# Patient Record
Sex: FEMALE | Race: WHITE | Employment: OTHER | URBAN - METROPOLITAN AREA
[De-identification: names, ages, dates, MRNs, and addresses within clinical notes are randomized per-mention and may not be internally consistent; named-entity substitution may affect disease eponyms.]

---

## 2019-09-16 ENCOUNTER — APPOINTMENT (OUTPATIENT)
Dept: GENERAL RADIOLOGY | Age: 59
End: 2019-09-16
Attending: EMERGENCY MEDICINE
Payer: COMMERCIAL

## 2019-09-16 ENCOUNTER — HOSPITAL ENCOUNTER (EMERGENCY)
Age: 59
Discharge: HOME | End: 2019-09-16
Attending: EMERGENCY MEDICINE
Payer: COMMERCIAL

## 2019-09-16 VITALS
OXYGEN SATURATION: 96 % | HEART RATE: 73 BPM | DIASTOLIC BLOOD PRESSURE: 77 MMHG | WEIGHT: 160 LBS | SYSTOLIC BLOOD PRESSURE: 129 MMHG | TEMPERATURE: 98.7 F | BODY MASS INDEX: 27.46 KG/M2 | RESPIRATION RATE: 12 BRPM

## 2019-09-16 DIAGNOSIS — R07.9 CHEST PAIN, UNSPECIFIED TYPE: Primary | ICD-10-CM

## 2019-09-16 LAB
ALBUMIN SERPL-MCNC: 3.8 G/DL (ref 3.4–5)
ALBUMIN/GLOB SERPL: 1.1 {RATIO} (ref 0.8–1.7)
ALP SERPL-CCNC: 78 U/L (ref 45–117)
ALT SERPL-CCNC: 21 U/L (ref 13–56)
ANION GAP SERPL CALC-SCNC: 4 MMOL/L (ref 3–18)
AST SERPL-CCNC: 20 U/L (ref 10–38)
ATRIAL RATE: 63 BPM
BASOPHILS # BLD: 0.1 K/UL (ref 0–0.1)
BASOPHILS NFR BLD: 1 % (ref 0–2)
BILIRUB SERPL-MCNC: 0.7 MG/DL (ref 0.2–1)
BUN SERPL-MCNC: 6 MG/DL (ref 7–18)
BUN/CREAT SERPL: 8 (ref 12–20)
CALCIUM SERPL-MCNC: 8.4 MG/DL (ref 8.5–10.1)
CALCULATED P AXIS, ECG09: 40 DEGREES
CALCULATED R AXIS, ECG10: -19 DEGREES
CALCULATED T AXIS, ECG11: 45 DEGREES
CHLORIDE SERPL-SCNC: 108 MMOL/L (ref 100–111)
CK MB CFR SERPL CALC: NORMAL % (ref 0–4)
CK MB CFR SERPL CALC: NORMAL % (ref 0–4)
CK MB SERPL-MCNC: <1 NG/ML (ref 5–25)
CK MB SERPL-MCNC: <1 NG/ML (ref 5–25)
CK SERPL-CCNC: 100 U/L (ref 26–192)
CK SERPL-CCNC: 115 U/L (ref 26–192)
CO2 SERPL-SCNC: 27 MMOL/L (ref 21–32)
CREAT SERPL-MCNC: 0.8 MG/DL (ref 0.6–1.3)
DIAGNOSIS, 93000: NORMAL
DIFFERENTIAL METHOD BLD: ABNORMAL
EOSINOPHIL # BLD: 0.1 K/UL (ref 0–0.4)
EOSINOPHIL NFR BLD: 2 % (ref 0–5)
ERYTHROCYTE [DISTWIDTH] IN BLOOD BY AUTOMATED COUNT: 12.2 % (ref 11.6–14.5)
GLOBULIN SER CALC-MCNC: 3.6 G/DL (ref 2–4)
GLUCOSE SERPL-MCNC: 98 MG/DL (ref 74–99)
HCT VFR BLD AUTO: 39.3 % (ref 35–45)
HGB BLD-MCNC: 13.9 G/DL (ref 12–16)
LYMPHOCYTES # BLD: 0.9 K/UL (ref 0.9–3.6)
LYMPHOCYTES NFR BLD: 18 % (ref 21–52)
MCH RBC QN AUTO: 31.4 PG (ref 24–34)
MCHC RBC AUTO-ENTMCNC: 35.4 G/DL (ref 31–37)
MCV RBC AUTO: 88.7 FL (ref 74–97)
MONOCYTES # BLD: 0.4 K/UL (ref 0.05–1.2)
MONOCYTES NFR BLD: 7 % (ref 3–10)
NEUTS SEG # BLD: 3.7 K/UL (ref 1.8–8)
NEUTS SEG NFR BLD: 72 % (ref 40–73)
P-R INTERVAL, ECG05: 164 MS
PLATELET # BLD AUTO: 268 K/UL (ref 135–420)
PMV BLD AUTO: 9.8 FL (ref 9.2–11.8)
POTASSIUM SERPL-SCNC: 3.5 MMOL/L (ref 3.5–5.5)
PROT SERPL-MCNC: 7.4 G/DL (ref 6.4–8.2)
Q-T INTERVAL, ECG07: 420 MS
QRS DURATION, ECG06: 96 MS
QTC CALCULATION (BEZET), ECG08: 429 MS
RBC # BLD AUTO: 4.43 M/UL (ref 4.2–5.3)
SODIUM SERPL-SCNC: 139 MMOL/L (ref 136–145)
TROPONIN I SERPL-MCNC: <0.02 NG/ML (ref 0–0.04)
TROPONIN I SERPL-MCNC: <0.02 NG/ML (ref 0–0.04)
VENTRICULAR RATE, ECG03: 63 BPM
WBC # BLD AUTO: 5.1 K/UL (ref 4.6–13.2)

## 2019-09-16 PROCEDURE — 71045 X-RAY EXAM CHEST 1 VIEW: CPT

## 2019-09-16 PROCEDURE — 80053 COMPREHEN METABOLIC PANEL: CPT

## 2019-09-16 PROCEDURE — 82550 ASSAY OF CK (CPK): CPT

## 2019-09-16 PROCEDURE — 99285 EMERGENCY DEPT VISIT HI MDM: CPT

## 2019-09-16 PROCEDURE — 93005 ELECTROCARDIOGRAM TRACING: CPT

## 2019-09-16 PROCEDURE — 85025 COMPLETE CBC W/AUTO DIFF WBC: CPT

## 2019-09-16 RX ORDER — NAPROXEN 500 MG/1
500 TABLET ORAL 2 TIMES DAILY WITH MEALS
Qty: 20 TAB | Refills: 0 | OUTPATIENT
Start: 2019-09-16 | End: 2019-09-20

## 2019-09-16 RX ORDER — NAPROXEN 500 MG/1
500 TABLET ORAL 2 TIMES DAILY WITH MEALS
Qty: 20 TAB | Refills: 0 | OUTPATIENT
Start: 2019-09-16 | End: 2019-09-18 | Stop reason: SDUPTHER

## 2019-09-16 NOTE — ED PROVIDER NOTES
EMERGENCY DEPARTMENT HISTORY AND PHYSICAL EXAM    11:24 AM      Date: 9/16/2019  Patient Name: Carlotta Sahu    History of Presenting Illness     Chief Complaint   Patient presents with    Chest Pain         History Provided By: Patient    Additional History (Context): Carlotta Sahu is a 61 y.o. female with No significant past medical history who presents with chest pain off and on for about a month. Patient has had midsternal chest pain since Friday shooting towards the back. She says it is about a 3 out of 10, constant, radiating towards the back. Patient states Coreg in the past has made better. Breathing deeply makes worse. Patient has some shortness of breath on exertion. She is going through extreme stressors at work. Patient denies smoking or recreational drug use. Occasional alcohol use. Anamika Ramos PCP: Genna De La Rosa MD      Current Outpatient Medications   Medication Sig Dispense Refill    naproxen (NAPROSYN) 500 mg tablet Take 1 Tab by mouth two (2) times daily (with meals) for 10 days. 20 Tab 0    naproxen (NAPROSYN) 500 mg tablet Take 1 Tab by mouth two (2) times daily (with meals) for 10 days. 20 Tab 0    ondansetron (ZOFRAN ODT) 4 mg disintegrating tablet Take 1-2 tablets every 6-8 hours as needed for nausea and vomiting. 10 Tab 2    oxyCODONE (OXYIR) 5 mg capsule Take 1-2 Caps by mouth every six (6) hours as needed. Max Daily Amount: 40 mg. 25 Cap 0    alprazolam (XANAX) 0.25 mg tablet       zolpidem (AMBIEN) 10 mg tablet       escitalopram oxalate (LEXAPRO) 10 mg tablet       ergocalciferol (ERGOCALCIFEROL) 50,000 unit capsule       CONTOUR NEXT STRIPS strip       cyanocobalamin (VITAMIN B-12) 2,500 mcg sublingual tablet Take 2,500 mcg by mouth daily.  calcium carbonate (CALTREX) 600 mg (1,500 mg) tablet Take 600 mg by mouth two (2) times a day.  multivitamins chew Take  by mouth.  Flinstones         Past History     Past Medical History:  Past Medical History: Diagnosis Date    Calculus of kidney     Congestive heart failure, unspecified ([facility] [address]     Liver disease     Fatty liver       Past Surgical History:  Past Surgical History:   Procedure Laterality Date    BREAST SURGERY PROCEDURE UNLISTED      augmentation    HX COLONOSCOPY      HX DILATION AND CURETTAGE      Multiple    HX GASTRIC BYPASS      HX HEMORRHOIDECTOMY      HX SEPTOPLASTY      HX TONSIL AND ADENOIDECTOMY         Family History:  Family History   Problem Relation Age of Onset    Heart Disease Mother     Cancer Mother     Hypertension Mother     Diabetes Mother     Heart Disease Father     Stroke Father     Cancer Brother     Hypertension Brother        Social History:  Social History     Tobacco Use    Smoking status: Never Smoker    Smokeless tobacco: Never Used   Substance Use Topics    Alcohol use: Yes    Drug use: No       Allergies: Allergies   Allergen Reactions    Tape [Adhesive] Other (comments)     Silk tape blisters         Review of Systems       Review of Systems   Constitutional: Negative. Negative for chills, diaphoresis and fever. HENT: Negative. Negative for congestion, rhinorrhea and sore throat. Eyes: Negative. Negative for pain, discharge and redness. Respiratory: Positive for chest tightness and shortness of breath. Negative for cough and wheezing. Cardiovascular: Negative. Negative for chest pain. Gastrointestinal: Negative. Negative for abdominal pain, constipation, diarrhea, nausea and vomiting. Genitourinary: Negative. Negative for dysuria, flank pain, frequency, hematuria and urgency. Musculoskeletal: Negative. Negative for back pain and neck pain. Skin: Negative. Negative for rash. Neurological: Negative. Negative for syncope, weakness, numbness and headaches. Psychiatric/Behavioral: Negative. All other systems reviewed and are negative.         Physical Exam     Visit Vitals  /77   Pulse 73   Temp 98.7 °F (37.1 °C) Resp 12   Wt 72.6 kg (160 lb)   SpO2 96%   BMI 27.46 kg/m²         Physical Exam   Constitutional: She is oriented to person, place, and time. She appears well-developed and well-nourished. Non-toxic appearance. She does not have a sickly appearance. She does not appear ill. No distress. HENT:   Head: Normocephalic and atraumatic. Mouth/Throat: Oropharynx is clear and moist. No oropharyngeal exudate. Eyes: Pupils are equal, round, and reactive to light. Conjunctivae and EOM are normal. No scleral icterus. Neck: Normal range of motion. Neck supple. No hepatojugular reflux and no JVD present. No tracheal deviation present. No thyromegaly present. Cardiovascular: Normal rate, regular rhythm, S1 normal, S2 normal, normal heart sounds, intact distal pulses and normal pulses. Exam reveals no gallop, no S3 and no S4. No murmur heard. Pulses:       Radial pulses are 2+ on the right side, and 2+ on the left side. Dorsalis pedis pulses are 2+ on the right side, and 2+ on the left side. Pulmonary/Chest: Effort normal and breath sounds normal. No respiratory distress. She has no decreased breath sounds. She has no wheezes. She has no rhonchi. She has no rales. Abdominal: Soft. Normal appearance and bowel sounds are normal. She exhibits no distension and no mass. There is no hepatosplenomegaly. There is no tenderness. There is no rigidity, no rebound, no guarding, no CVA tenderness, no tenderness at McBurney's point and negative Fox's sign. Musculoskeletal: Normal range of motion. She exhibits no edema or tenderness. Strength 5 out of 5 throughout. Lymphadenopathy:        Head (right side): No submental, no submandibular, no preauricular and no occipital adenopathy present. Head (left side): No submental, no submandibular, no preauricular and no occipital adenopathy present. She has no cervical adenopathy. Right: No supraclavicular adenopathy present.         Left: No supraclavicular adenopathy present. Neurological: She is alert and oriented to person, place, and time. She has normal strength and normal reflexes. She is not disoriented. No cranial nerve deficit or sensory deficit. Coordination and gait normal. GCS eye subscore is 4. GCS verbal subscore is 5. GCS motor subscore is 6. Grossly intact. Skin: Skin is warm, dry and intact. No rash noted. She is not diaphoretic. Psychiatric: She has a normal mood and affect. Her speech is normal and behavior is normal. Judgment and thought content normal. Cognition and memory are normal.   Nursing note and vitals reviewed. Diagnostic Study Results     Labs -  Recent Results (from the past 12 hour(s))   EKG, 12 LEAD, INITIAL    Collection Time: 09/16/19 10:33 AM   Result Value Ref Range    Ventricular Rate 63 BPM    Atrial Rate 63 BPM    P-R Interval 164 ms    QRS Duration 96 ms    Q-T Interval 420 ms    QTC Calculation (Bezet) 429 ms    Calculated P Axis 40 degrees    Calculated R Axis -19 degrees    Calculated T Axis 45 degrees    Diagnosis       Normal sinus rhythm  Low voltage QRS  Borderline ECG  When compared with ECG of 05-FEB-2015 10:03,  No significant change was found  Confirmed by Kalie Chairez ([id number]) on 9/16/2019 11:48:06 AM     CBC WITH AUTOMATED DIFF    Collection Time: 09/16/19 11:00 AM   Result Value Ref Range    WBC 5.1 4.6 - 13.2 K/uL    RBC 4.43 4.20 - 5.30 M/uL    HGB 13.9 12.0 - 16.0 g/dL    HCT 39.3 35.0 - 45.0 %    MCV 88.7 74.0 - 97.0 FL    MCH 31.4 24.0 - 34.0 PG    MCHC 35.4 31.0 - 37.0 g/dL    RDW 12.2 11.6 - 14.5 %    PLATELET 085 544 - 011 K/uL    MPV 9.8 9.2 - 11.8 FL    NEUTROPHILS 72 40 - 73 %    LYMPHOCYTES 18 (L) 21 - 52 %    MONOCYTES 7 3 - 10 %    EOSINOPHILS 2 0 - 5 %    BASOPHILS 1 0 - 2 %    ABS. NEUTROPHILS 3.7 1.8 - 8.0 K/UL    ABS. LYMPHOCYTES 0.9 0.9 - 3.6 K/UL    ABS. MONOCYTES 0.4 0.05 - 1.2 K/UL    ABS. EOSINOPHILS 0.1 0.0 - 0.4 K/UL    ABS.  BASOPHILS 0.1 0.0 - 0.1 K/UL    DF AUTOMATED     METABOLIC PANEL, COMPREHENSIVE    Collection Time: 09/16/19 11:00 AM   Result Value Ref Range    Sodium 139 136 - 145 mmol/L    Potassium 3.5 3.5 - 5.5 mmol/L    Chloride 108 100 - 111 mmol/L    CO2 27 21 - 32 mmol/L    Anion gap 4 3.0 - 18 mmol/L    Glucose 98 74 - 99 mg/dL    BUN 6 (L) 7.0 - 18 MG/DL    Creatinine 0.80 0.6 - 1.3 MG/DL    BUN/Creatinine ratio 8 (L) 12 - 20      GFR est AA >60 >60 ml/min/1.73m2    GFR est non-AA >60 >60 ml/min/1.73m2    Calcium 8.4 (L) 8.5 - 10.1 MG/DL    Bilirubin, total 0.7 0.2 - 1.0 MG/DL    ALT (SGPT) 21 13 - 56 U/L    AST (SGOT) 20 10 - 38 U/L    Alk. phosphatase 78 45 - 117 U/L    Protein, total 7.4 6.4 - 8.2 g/dL    Albumin 3.8 3.4 - 5.0 g/dL    Globulin 3.6 2.0 - 4.0 g/dL    A-G Ratio 1.1 0.8 - 1.7     CARDIAC PANEL,(CK, CKMB & TROPONIN)    Collection Time: 09/16/19 11:00 AM   Result Value Ref Range     26 - 192 U/L    CK - MB <1.0 <3.6 ng/ml    CK-MB Index  0.0 - 4.0 %     CALCULATION NOT PERFORMED WHEN RESULT IS BELOW LINEAR LIMIT    Troponin-I, QT <0.02 0.0 - 0.045 NG/ML   CARDIAC PANEL,(CK, CKMB & TROPONIN)    Collection Time: 09/16/19  1:59 PM   Result Value Ref Range     26 - 192 U/L    CK - MB <1.0 <3.6 ng/ml    CK-MB Index  0.0 - 4.0 %     CALCULATION NOT PERFORMED WHEN RESULT IS BELOW LINEAR LIMIT    Troponin-I, QT <0.02 0.0 - 0.045 NG/ML       Radiologic Studies -   XR CHEST PORT   Final Result   Impression:   1. No acute cardiopulmonary process. Medical Decision Making   Provider Notes (Medical Decision Making):  MDM  Number of Diagnoses or Management Options  Chest pain, unspecified type:       I am the first provider for this patient. I reviewed the vital signs, available nursing notes, past medical history, past surgical history, family history and social history. Vital Signs-Reviewed the patient's vital signs.     Records Reviewed: Nursing Notes (Time of Review: 11:24 AM)    ED Course: Progress Notes, Reevaluation, and Consults:    Labs essentially normal.   Chest X-Ray showed No acute process. EKG showed NSR with rate of 63 bpm. With no ST elevations or depression and non specific T wave changes. 2:24 PM 9/16/2019        Diagnosis       I have reassessed the patient. Patient is feeling well. Patient was discharged in stable condition. Patient is to return to emergency department if any new or worsening condition. Clinical Impression:   1. Chest pain, unspecified type        Disposition: D/C home     Follow-up Information     Follow up With Specialties Details Why Contact Info    Ernesto Dee MD Internal Medicine In 2 days  [address] [facility] [address]  [phone]               Attestation        Provider Attestation:     I personally performed the services described in the documentation, reviewed the documentation and it accurately and completely records my words and actions utilizing the [address] September 16, 2019 at 3:22 PM - [place], [address]. It is dictated using utilizing voice recognition software. Unfortunately this leads to occasional typographical errors. I apologize in advance if the situation occurs. If questions arise please do not hesitate to contact me or call our department.

## 2019-09-16 NOTE — DISCHARGE INSTRUCTIONS
Patient Education        Chest Pain: Care Instructions  Your Care Instructions    There are many things that can cause chest pain. Some are not serious and will get better on their own in a few days. But some kinds of chest pain need more testing and treatment. Your doctor may have recommended a follow-up visit in the next 8 to 12 hours. If you are not getting better, you may need more tests or treatment. Even though your doctor has released you, you still need to watch for any problems. The doctor carefully checked you, but sometimes problems can develop later. If you have new symptoms or if your symptoms do not get better, get medical care right away. If you have worse or different chest pain or pressure that lasts more than 5 minutes or you passed out (lost consciousness), call 911 or seek other emergency help right away. A medical visit is only one step in your treatment. Even if you feel better, you still need to do what your doctor recommends, such as going to all suggested follow-up appointments and taking medicines exactly as directed. This will help you recover and help prevent future problems. How can you care for yourself at home? · Rest until you feel better. · Take your medicine exactly as prescribed. Call your doctor if you think you are having a problem with your medicine. · Do not drive after taking a prescription pain medicine. When should you call for help? Call 911 if:    · You passed out (lost consciousness).     · You have severe difficulty breathing.     · You have symptoms of a heart attack. These may include:  ? Chest pain or pressure, or a strange feeling in your chest.  ? Sweating. ? Shortness of breath. ? Nausea or vomiting. ? Pain, pressure, or a strange feeling in your back, neck, jaw, or upper belly or in one or both shoulders or arms. ? Lightheadedness or sudden weakness. ? A fast or irregular heartbeat.   After you call 911, the  may tell you to chew 1 adult-strength or 2 to 4 low-dose aspirin. Wait for an ambulance. Do not try to drive yourself.    Call your doctor today if:    · You have any trouble breathing.     · Your chest pain gets worse.     · You are dizzy or lightheaded, or you feel like you may faint.     · You are not getting better as expected.     · You are having new or different chest pain. Where can you learn more? Go to http://temi-audrey.info/. Enter A120 in the search box to learn more about \"Chest Pain: Care Instructions. \"  Current as of: 2018  Content Version: 12.1  © 6920-3972 "[employer]. Care instructions adapted under license by [employer] (which disclaims liability or warranty for this information). If you have questions about a medical condition or this instruction, always ask your healthcare professional. [address] any warranty or liability for your use of this information. indeni Activation    Thank you for requesting access to indeni. Please follow the instructions below to securely access and download your online medical record. indeni allows you to send messages to your doctor, view your test results, renew your prescriptions, schedule appointments, and more. How Do I Sign Up? 1. In your internet browser, go to [email or web address]  2. Click on the First Time User? Click Here link in the Sign In box. You will be redirect to the New Member Sign Up page. 3. Enter your indeni Access Code exactly as it appears below. You will not need to use this code after youve completed the sign-up process. If you do not sign up before the expiration date, you must request a new code. indeni Access Code: [email or web address]  Expires: 10/31/2019 10:29 AM (This is the date your indeni access code will )    4. Enter the last four digits of your Social Security Number (xxxx) and Date of Birth (mm/dd/yyyy) as indicated and click Submit.  You will be taken to the next sign-up page. 5. Create a Alive Juicest ID. This will be your [employer] login ID and cannot be changed, so think of one that is secure and easy to remember. 6. Create a [employer] password. You can change your password at any time. 7. Enter your Password Reset Question and Answer. This can be used at a later time if you forget your password. 8. Enter your e-mail address. You will receive e-mail notification when new information is available in [address]. 9. Click Sign Up. You can now view and download portions of your medical record. 10. Click the Download Summary menu link to download a portable copy of your medical information. Additional Information    If you have questions, please visit the Frequently Asked Questions section of the [employer] website at [email or web address] Remember, [employer] is NOT to be used for urgent needs. For medical emergencies, dial 911.

## 2019-09-18 ENCOUNTER — OFFICE VISIT (OUTPATIENT)
Dept: CARDIOLOGY CLINIC | Age: 59
End: 2019-09-18

## 2019-09-18 VITALS
HEIGHT: 64 IN | WEIGHT: 159 LBS | BODY MASS INDEX: 27.14 KG/M2 | DIASTOLIC BLOOD PRESSURE: 82 MMHG | HEART RATE: 64 BPM | SYSTOLIC BLOOD PRESSURE: 126 MMHG

## 2019-09-18 DIAGNOSIS — I10 ESSENTIAL HYPERTENSION: ICD-10-CM

## 2019-09-18 DIAGNOSIS — Z82.49 FAMILY HISTORY OF PREMATURE CAD: ICD-10-CM

## 2019-09-18 DIAGNOSIS — I20.0 INTERMEDIATE CORONARY SYNDROME (HCC): Primary | ICD-10-CM

## 2019-09-18 RX ORDER — CARVEDILOL 6.25 MG/1
TABLET ORAL 2 TIMES DAILY WITH MEALS
COMMUNITY

## 2019-09-18 RX ORDER — BUPROPION HYDROCHLORIDE 300 MG/1
150 TABLET ORAL
COMMUNITY
End: 2022-02-08

## 2019-09-18 RX ORDER — ASPIRIN 81 MG/1
TABLET ORAL DAILY
COMMUNITY
End: 2019-09-20

## 2019-09-18 NOTE — PATIENT INSTRUCTIONS
High Blood Pressure: Care Instructions  Overview    It's normal for blood pressure to go up and down throughout the day. But if it stays up, you have high blood pressure. Another name for high blood pressure is hypertension. Despite what a lot of people think, high blood pressure usually doesn't cause headaches or make you feel dizzy or lightheaded. It usually has no symptoms. But it does increase your risk of stroke, heart attack, and other problems. You and your doctor will talk about your risks of these problems based on your blood pressure. Your doctor will give you a goal for your blood pressure. Your goal will be based on your health and your age. Lifestyle changes, such as eating healthy and being active, are always important to help lower blood pressure. You might also take medicine to reach your blood pressure goal.  Follow-up care is a key part of your treatment and safety. Be sure to make and go to all appointments, and call your doctor if you are having problems. It's also a good idea to know your test results and keep a list of the medicines you take. How can you care for yourself at home? Medical treatment  · If you stop taking your medicine, your blood pressure will go back up. You may take one or more types of medicine to lower your blood pressure. Be safe with medicines. Take your medicine exactly as prescribed. Call your doctor if you think you are having a problem with your medicine. · Talk to your doctor before you start taking aspirin every day. Aspirin can help certain people lower their risk of a heart attack or stroke. But taking aspirin isn't right for everyone, because it can cause serious bleeding. · See your doctor regularly. You may need to see the doctor more often at first or until your blood pressure comes down. · If you are taking blood pressure medicine, talk to your doctor before you take decongestants or anti-inflammatory medicine, such as ibuprofen.  Some of these medicines can raise blood pressure. · Learn how to check your blood pressure at home. Lifestyle changes  · Stay at a healthy weight. This is especially important if you put on weight around the waist. Losing even 10 pounds can help you lower your blood pressure. · If your doctor recommends it, get more exercise. Walking is a good choice. Bit by bit, increase the amount you walk every day. Try for at least 30 minutes on most days of the week. You also may want to swim, bike, or do other activities. · Avoid or limit alcohol. Talk to your doctor about whether you can drink any alcohol. · Try to limit how much sodium you eat to less than 2,300 milligrams (mg) a day. Your doctor may ask you to try to eat less than 1,500 mg a day. · Eat plenty of fruits (such as bananas and oranges), vegetables, legumes, whole grains, and low-fat dairy products. · Lower the amount of saturated fat in your diet. Saturated fat is found in animal products such as milk, cheese, and meat. Limiting these foods may help you lose weight and also lower your risk for heart disease. · Do not smoke. Smoking increases your risk for heart attack and stroke. If you need help quitting, talk to your doctor about stop-smoking programs and medicines. These can increase your chances of quitting for good. When should you call for help? Call 911 anytime you think you may need emergency care. This may mean having symptoms that suggest that your blood pressure is causing a serious heart or blood vessel problem. Your blood pressure may be over 180/120.   For example, call 911 if:    · You have symptoms of a heart attack. These may include:  ? Chest pain or pressure, or a strange feeling in the chest.  ? Sweating. ? Shortness of breath. ? Nausea or vomiting. ? Pain, pressure, or a strange feeling in the back, neck, jaw, or upper belly or in one or both shoulders or arms. ? Lightheadedness or sudden weakness.   ? A fast or irregular heartbeat.     · You have symptoms of a stroke. These may include:  ? Sudden numbness, tingling, weakness, or loss of movement in your face, arm, or leg, especially on only one side of your body. ? Sudden vision changes. ? Sudden trouble speaking. ? Sudden confusion or trouble understanding simple statements. ? Sudden problems with walking or balance. ? A sudden, severe headache that is different from past headaches.     · You have severe back or belly pain.    Do not wait until your blood pressure comes down on its own. Get help right away.   Call your doctor now or seek immediate care if:    · Your blood pressure is much higher than normal (such as 180/120 or higher), but you don't have symptoms.     · You think high blood pressure is causing symptoms, such as:  ? Severe headache.  ? Blurry vision.    Watch closely for changes in your health, and be sure to contact your doctor if:    · Your blood pressure measures higher than your doctor recommends at least 2 times. That means the top number is higher or the bottom number is higher, or both.     · You think you may be having side effects from your blood pressure medicine. Where can you learn more? Go to http://temi-audrey.info/. Enter P993 in the search box to learn more about \"High Blood Pressure: Care Instructions. \"  Current as of: July 22, 2018  Content Version: 12.1  © 2391-3823 [employer]. Care instructions adapted under license by [employer] (which disclaims liability or warranty for this information). If you have questions about a medical condition or this instruction, always ask your healthcare professional. [address] any warranty or liability for your use of this information. [facility] Activation    Thank you for requesting access to [facility]. Please follow the instructions below to securely access and download your online medical record.  [facility] allows you to send messages to your doctor, view your test results, renew your prescriptions, schedule appointments, and more. How Do I Sign Up? 1. In your internet browser, go to [email or web address]. 2. Click on the First Time User? Click Here link in the Sign In box. You will see the New Member Sign Up page. 3. Enter your [facility] Access Code exactly as it appears below. You will not need to use this code after youve completed the sign-up process. If you do not sign up before the expiration date, you must request a new code. [facility] Access Code: [email or web address]  Expires: 10/31/2019 10:29 AM (This is the date your [facility] access code will )    4. Enter the last four digits of your Social Security Number (xxxx) and Date of Birth (mm/dd/yyyy) as indicated and click Submit. You will be taken to the next sign-up page. 5. Create a [facility] ID. This will be your [facility] login ID and cannot be changed, so think of one that is secure and easy to remember. 6. Create a [facility] password. You can change your password at any time. 7. Enter your Password Reset Question and Answer. This can be used at a later time if you forget your password. 8. Enter your e-mail address. You will receive e-mail notification when new information is available in [address] 9. Click Sign Up. You can now view and download portions of your medical record. 10. Click the Download Summary menu link to download a portable copy of your medical information. Additional Information    If you have questions, please visit the Frequently Asked Questions section of the [employer] [facility] website at [email or web address] Remember, [facility] is NOT to be used for urgent needs. For medical emergencies, dial 911. Instructions    Patients Name:  James Whitehead    1. You are scheduled to have a Cath on 19  at [facility] Please check in at        .      2. Please go to [facility] and yesica in the outpatient parking lot that is located around to the back of the hospital and enter through the [address]. Once you enter through the [facility] check in with the  there. The  will either give you directions or assist you in getting to the cath holding area. 3. You are not to eat anything after midnight before the procedure. Please continue to drink fluids up until 12:00.  (water, juice, black coffee, soft drinks). Do not drink milk or milk products or anything with cream. You may take medications(except for diabetes) with a small sip of water before 6am on the day of the procedure. .    4. If you are diabetic, do not take your insulin/sugar pill the morning of the procedure. 5. MEDICATION INSTRUCTIONS:   Please take your morning medications with the following special instructions:    [x]          Please make sure to take your Blood pressure medication : With just enough water to swallow. [x]          Take your Aspirin and/or Plavix. With just enough water to swallow. []          Stop your Coumadin on   and do not resume it until after the procedure.     []          Take Prednisone 60 mg and Benadryl 25 mg by mouth at Bedtime on  and again on  at . This is to prevent you from having an allergic reaction to the dye. 6. We encourage families to wait in the waiting room on the first floor while the procedure is being done. The Doctor will come out and talk with you as soon as the procedure is over. 7. There is the possibility that you may spend the night in the hospital, depending on the results of the procedure. This will be determined after the procedure is done. If angioplasty or stent is planned, you will stay at least one day. 8. If you or your family have any questions, please call our office Monday -Friday, 9:00 a.m.-4:30 p.m.,  At [phone], and ask to speak to one of the nurses.

## 2019-09-18 NOTE — PROGRESS NOTES
HISTORY OF PRESENT ILLNESS  Riri Ward is a 61 y.o. female. New Patient   The history is provided by the patient. This is a recurrent problem. The problem occurs daily. The problem has been gradually worsening. Associated symptoms include chest pain. Pertinent negatives include no abdominal pain, no headaches and no shortness of breath. Chest Pain    The history is provided by the patient. This is a new problem. The problem occurs daily. The pain is present in the substernal region. The pain is at a severity of 4/10. The pain is mild. The quality of the pain is described as pressure-like. The pain radiates to the mid back. Pertinent negatives include no abdominal pain, no claudication, no cough, no diaphoresis, no dizziness, no fever, no headaches, no hemoptysis, no nausea, no orthopnea, no palpitations, no PND, no shortness of breath, no sputum production, no vomiting and no weakness. Risk factors include family history and hypertension. Review of Systems   Constitutional: Negative for chills, diaphoresis, fever and weight loss. HENT: Negative for ear pain and hearing loss. Eyes: Negative for blurred vision. Respiratory: Negative for cough, hemoptysis, sputum production, shortness of breath, wheezing and stridor. Cardiovascular: Positive for chest pain. Negative for palpitations, orthopnea, claudication, leg swelling and PND. Gastrointestinal: Negative for abdominal pain, heartburn, nausea and vomiting. Musculoskeletal: Negative for myalgias and neck pain. Skin: Negative for rash. Neurological: Negative for dizziness, tingling, tremors, focal weakness, loss of consciousness, weakness and headaches. Psychiatric/Behavioral: Negative for depression and suicidal ideas.      Family History   Problem Relation Age of Onset    Heart Disease Mother     Cancer Mother     Hypertension Mother     Diabetes Mother     Heart Disease Father     Stroke Father     Cancer Brother     Hypertension Brother        Past Medical History:   Diagnosis Date    Calculus of kidney     Congestive heart failure, unspecified     Liver disease     Fatty liver       Past Surgical History:   Procedure Laterality Date    BREAST SURGERY PROCEDURE UNLISTED      augmentation    HX COLONOSCOPY      HX DILATION AND CURETTAGE      Multiple    HX GASTRIC BYPASS      HX HEMORRHOIDECTOMY      HX SEPTOPLASTY      HX TONSIL AND ADENOIDECTOMY         Social History     Tobacco Use    Smoking status: Never Smoker    Smokeless tobacco: Never Used   Substance Use Topics    Alcohol use: Yes       Allergies   Allergen Reactions    Tape [Adhesive] Other (comments)     Silk tape blisters       Outpatient Medications Marked as Taking for the 9/18/19 encounter (Office Visit) with Alexia House MD   Medication Sig Dispense Refill    buPROPion XL (WELLBUTRIN XL) 300 mg XL tablet Take 300 mg by mouth every morning.  aspirin delayed-release 81 mg tablet Take  by mouth daily.  diphenhydramine HCl (BENADRYL PO) Take  by mouth as needed.  carvedilol (COREG) 6.25 mg tablet Take  by mouth two (2) times daily (with meals).  alprazolam (XANAX) 0.25 mg tablet       zolpidem (AMBIEN) 10 mg tablet       escitalopram oxalate (LEXAPRO) 10 mg tablet       ergocalciferol (ERGOCALCIFEROL) 50,000 unit capsule       CONTOUR NEXT STRIPS strip       cyanocobalamin (VITAMIN B-12) 2,500 mcg sublingual tablet Take 2,500 mcg by mouth daily.  calcium carbonate (CALTREX) 600 mg (1,500 mg) tablet Take 600 mg by mouth two (2) times a day.  multivitamins chew Take  by mouth. [facility]          Visit Vitals  /82   Pulse 64   Ht 5' 4\" (1.626 m)   Wt 72.1 kg (159 lb)   BMI 27.29 kg/m²     Physical Exam   Constitutional: She is oriented to person, place, and time. She appears well-developed and well-nourished. No distress. HENT:   Head: Atraumatic. Mouth/Throat: No oropharyngeal exudate.    Eyes: Conjunctivae are normal. Right eye exhibits no discharge. Left eye exhibits no discharge. No scleral icterus. Neck: Neck supple. No JVD present. No tracheal deviation present. No thyromegaly present. Cardiovascular: Normal rate and regular rhythm. Exam reveals no gallop. No murmur heard. Pulmonary/Chest: Effort normal and breath sounds normal. No stridor. She has no wheezes. She has no rales. Abdominal: Soft. There is no tenderness. There is no rebound. Musculoskeletal: Normal range of motion. She exhibits no edema or tenderness. Neurological: She is alert and oriented to person, place, and time. She exhibits normal muscle tone. Skin: Skin is warm. She is not diaphoretic. Psychiatric: She has a normal mood and affect. Her behavior is normal.     ekg sinus rhythm with no acute st-t changes  ASSESSMENT and PLAN    ICD-10-CM ICD-9-CM    1. Intermediate coronary syndrome (HCC) I20.0 411.1 CASE REQUEST CATH LAB    [facility] 3   2. Essential hypertension I10 401.9    3. Family history of premature CAD Z82.49 V17.3      No orders of the defined types were placed in this encounter. Follow-up and Dispositions    · Return in about 3 weeks (around 10/9/2019). current treatment plan is effective, no change in therapy. She is a 60-year-old female with long-standing history of hypertension and family history of premature CAD seen for recurrent episodes of chest pain. Reports chest pain is retrosternal/epigastric radiating to back lasting for several minutes. Reports several episodes in the last 3 to 5 days. EKG reviewed no acute ischemic changes. Echo at her PCP office revealed normal sinus rhythm with moderate left ventricle hypertrophy and grade 1 diastolic dysfunction. [facility] was [facility] and serial cardiac enzymes were negative. Due to recurrent chest pain and multiple cardiac risk factors, patient prefers to proceed with invasive work-up to rule out CAD.   Advised to continue aspirin at this time.

## 2019-09-20 ENCOUNTER — HOSPITAL ENCOUNTER (OUTPATIENT)
Age: 59
Setting detail: OUTPATIENT SURGERY
Discharge: HOME | End: 2019-09-20
Attending: INTERNAL MEDICINE | Admitting: INTERNAL MEDICINE
Payer: COMMERCIAL

## 2019-09-20 VITALS
SYSTOLIC BLOOD PRESSURE: 86 MMHG | WEIGHT: 159 LBS | TEMPERATURE: 98.1 F | HEART RATE: 68 BPM | DIASTOLIC BLOOD PRESSURE: 58 MMHG | BODY MASS INDEX: 27.14 KG/M2 | HEIGHT: 64 IN | RESPIRATION RATE: 17 BRPM | OXYGEN SATURATION: 96 %

## 2019-09-20 DIAGNOSIS — I20.0 INTERMEDIATE CORONARY SYNDROME (HCC): ICD-10-CM

## 2019-09-20 LAB — END DIASTOLIC PRESSURE: 13

## 2019-09-20 PROCEDURE — 74011250636 HC RX REV CODE- 250/636: Performed by: INTERNAL MEDICINE

## 2019-09-20 PROCEDURE — 99152 MOD SED SAME PHYS/QHP 5/>YRS: CPT | Performed by: INTERNAL MEDICINE

## 2019-09-20 PROCEDURE — 74011636320 HC RX REV CODE- 636/320: Performed by: INTERNAL MEDICINE

## 2019-09-20 PROCEDURE — 74011250636 HC RX REV CODE- 250/636

## 2019-09-20 PROCEDURE — 77030013744: Performed by: INTERNAL MEDICINE

## 2019-09-20 PROCEDURE — 77030029997 HC DEV COM RDL R BND TELE -B: Performed by: INTERNAL MEDICINE

## 2019-09-20 PROCEDURE — 93458 L HRT ARTERY/VENTRICLE ANGIO: CPT | Performed by: INTERNAL MEDICINE

## 2019-09-20 PROCEDURE — C1894 INTRO/SHEATH, NON-LASER: HCPCS | Performed by: INTERNAL MEDICINE

## 2019-09-20 PROCEDURE — 74011000250 HC RX REV CODE- 250: Performed by: INTERNAL MEDICINE

## 2019-09-20 PROCEDURE — 77030013797 HC KT TRNSDUC PRSSR EDWD -A: Performed by: INTERNAL MEDICINE

## 2019-09-20 PROCEDURE — 77030015766: Performed by: INTERNAL MEDICINE

## 2019-09-20 RX ORDER — HEPARIN SODIUM 1000 [USP'U]/ML
INJECTION, SOLUTION INTRAVENOUS; SUBCUTANEOUS AS NEEDED
Status: DISCONTINUED | OUTPATIENT
Start: 2019-09-20 | End: 2019-09-20 | Stop reason: HOSPADM

## 2019-09-20 RX ORDER — LIDOCAINE HYDROCHLORIDE 10 MG/ML
INJECTION, SOLUTION EPIDURAL; INFILTRATION; INTRACAUDAL; PERINEURAL AS NEEDED
Status: DISCONTINUED | OUTPATIENT
Start: 2019-09-20 | End: 2019-09-20 | Stop reason: HOSPADM

## 2019-09-20 RX ORDER — SODIUM CHLORIDE 0.9 % (FLUSH) 0.9 %
5-40 SYRINGE (ML) INJECTION EVERY 8 HOURS
Status: CANCELLED | OUTPATIENT
Start: 2019-09-20

## 2019-09-20 RX ORDER — FENTANYL CITRATE 50 UG/ML
INJECTION, SOLUTION INTRAMUSCULAR; INTRAVENOUS AS NEEDED
Status: DISCONTINUED | OUTPATIENT
Start: 2019-09-20 | End: 2019-09-20 | Stop reason: HOSPADM

## 2019-09-20 RX ORDER — NITROGLYCERIN 40 MG/100ML
INJECTION INTRAVENOUS
Status: COMPLETED | OUTPATIENT
Start: 2019-09-20 | End: 2019-09-20

## 2019-09-20 RX ORDER — VERAPAMIL HYDROCHLORIDE 2.5 MG/ML
INJECTION, SOLUTION INTRAVENOUS AS NEEDED
Status: DISCONTINUED | OUTPATIENT
Start: 2019-09-20 | End: 2019-09-20 | Stop reason: HOSPADM

## 2019-09-20 RX ORDER — SODIUM CHLORIDE 0.9 % (FLUSH) 0.9 %
5-40 SYRINGE (ML) INJECTION AS NEEDED
Status: CANCELLED | OUTPATIENT
Start: 2019-09-20

## 2019-09-20 RX ORDER — MIDAZOLAM HYDROCHLORIDE 1 MG/ML
INJECTION, SOLUTION INTRAMUSCULAR; INTRAVENOUS AS NEEDED
Status: DISCONTINUED | OUTPATIENT
Start: 2019-09-20 | End: 2019-09-20 | Stop reason: HOSPADM

## 2019-09-20 RX ADMIN — SODIUM CHLORIDE 250 ML: 9 INJECTION, SOLUTION INTRAVENOUS at 14:45

## 2019-09-20 RX ADMIN — SODIUM CHLORIDE 500 ML: 900 INJECTION, SOLUTION INTRAVENOUS at 14:09

## 2019-09-20 NOTE — Clinical Note
TRANSFER - IN REPORT:  
 
Verbal report received from: [address] [place] [address]. Report consisted of patient's Situation, Background, Assessment and  
Recommendations(SBAR). Opportunity for questions and clarification was provided. Assessment completed upon patient's arrival to unit and care assumed. Patient transported with a Cardiac Cath Tech / Patient Care Tech.

## 2019-09-20 NOTE — DISCHARGE INSTRUCTIONS
HEART CATHETERIZATION/ANGIOGRAPHY DISCHARGE INSTRUCTIONS    1. Check puncture site frequently for swelling or bleeding. If there is any bleeding, lie down and apply pressure over the area with a clean towel or washcloth. Notify your doctor for any redness, swelling, drainage, or oozing from the puncture site. Notify your doctor for any fever or chills. 2. If the extremity becomes cold, numb, or painful call  at [phone].  3. Activity should be limited for the next 48 hours. Climb stairs as little as possible and avoid any stooping, bending, or strenuous activity for 48 hours. No heavy lifting (anything over 10 pounds) for 3 days. 4. You may resume your usual diet. Drink more fluids than usual.  5. Have a responsible person drive you home and stay with you for at least 24 hours after your heart catheterization/angiography. 6. You may remove bandage from your Right and Arm in 24 hours. You may shower in 24 hours. No tub baths, hot tubs, or swimming for 1 week. Do not place any lotions, creams, powders, or ointments over puncture site for 1 week. You may place a clean band-aid over the puncture site each day for 5 days. Change daily. Coronary Angiogram: What to Expect at [address] [place] [address]     A coronary angiogram is a test to examine the large blood vessel of your heart (coronary artery). The doctor inserted a thin, flexible tube (catheter) into a blood vessel in your groin. In some cases, the catheter is placed in a blood vessel in the arm. Your groin or arm may have a bruise and feel sore for a day or two after a coronary angiogram. You can do light activities around the house but nothing strenuous for several days. This care sheet gives you a general idea about how long it will take for you to recover. But each person recovers at a different pace. Follow the steps below to feel better as quickly as possible. How can you care for yourself at home?   Activity  · Do not do strenuous exercise and do not lift, pull, or push anything heavy until your doctor says it is okay. This may be for a day or two. You can walk around the house and do light activity, such as cooking. · You may shower 24 to 48 hours after the procedure, if your doctor okays it. Pat the incision dry. Do not take a bath for 1 week, or until your doctor tells you it is okay. · If the catheter was placed in your groin, try not to walk up stairs for the first couple of days. · If the catheter was placed in your arm near your wrist, do not bend your wrist deeply for the first couple of days. Be careful using your hand to get into and out of a chair or bed. · If your doctor recommends it, get more exercise. Walking is a good choice. Bit by bit, increase the amount you walk every day. Try for at least 30 minutes on most days of the week. Diet  · Drink plenty of fluids to help your body flush out the dye. If you have kidney, heart, or liver disease and have to limit fluids, talk with your doctor before you increase the amount of fluids you drink. · Keep eating a heart-healthy diet that has lots of fruits, vegetables, and whole grains. If you have not been eating this way, talk to your doctor. You also may want to talk to a dietitian. This expert can help you to learn about healthy foods and plan meals. Medicines  · Your doctor will tell you if and when you can restart your medicines. He or she will also give you instructions about taking any new medicines. · If you take blood thinners, such as warfarin (Coumadin), clopidogrel (Plavix), or aspirin, be sure to talk to your doctor. He or she will tell you if and when to start taking those medicines again. Make sure that you understand exactly what your doctor wants you to do. · Your doctor may prescribe a blood-thinning medicine like aspirin or clopidogrel (Plavix).  It is very important that you take these medicines exactly as directed in order to keep the coronary artery open and reduce your risk of a heart attack. Be safe with medicines. Call your doctor if you think you are having a problem with your medicine. Care of the catheter site  · For the first 3 days, keep a bandage over the spot where the catheter was inserted. · Put ice or a cold pack on the area for 10 to 20 minutes at a time to help with soreness or swelling. Put a thin cloth between the ice and your skin. Sedation for a Medical Procedure: Care Instructions  Your Care Instructions  For a minor procedure or surgery, you will get a sedative to help you relax. This drug will make you sleepy. It is usually given in a vein (by IV). A shot may also be used to numb the area. If you had local anesthesia, you may feel some pain and discomfort as it wears off. If you have pain, don't be afraid to say so. Pain medicine works better if you take it before the pain gets bad. Common side effects from sedation include:  · Feeling sleepy. (Your doctors and nurses will make sure you are not too sleepy to go home.)  · Nausea and vomiting. This usually does not last long. · Feeling tired. Follow-up care is a key part of your treatment and safety. Be sure to make and go to all appointments, and call your doctor if you are having problems. It's also a good idea to know your test results and keep a list of the medicines you take. How can you care for yourself at home? Activity  · Don't do anything for 24 hours that requires attention to detail. It takes time for the medicine effects to completely wear off. · For your safety, you should not drive or operate any machinery that could be dangerous until the medicine wears off and you can think clearly and react easily. · Rest when you feel tired. Getting enough sleep will help you recover. Diet  · You can eat your normal diet, unless your doctor gives you other instructions. If your stomach is upset, try clear liquids and bland, low-fat foods like plain toast or rice.   · Drink plenty of fluids (unless your doctor tells you not to). · Don't drink alcohol for 24 hours. Medicines  · Be safe with medicines. Read and follow all instructions on the label. ¨ If the doctor gave you a prescription medicine for pain, take it as prescribed. ¨ If you are not taking a prescription pain medicine, ask your doctor if you can take an over-the-counter medicine. · If you think your pain medicine is making you sick to your stomach:  ¨ Take your medicine after meals (unless your doctor has told you not to). ¨ Ask your doctor for a different pain medicine. Follow-up care is a key part of your treatment and safety. Be sure to make and go to all appointments, and call your doctor if you are having problems. It's also a good idea to know your test results and keep a list of the medicines you take. When should you call for help? Call 911 anytime you think you may need emergency care. For example, call if:  · You passed out (lost consciousness). · You have severe trouble breathing. · You have sudden chest pain and shortness of breath, or you cough up blood. · You have symptoms of a heart attack. These may include:  ¨ Chest pain or pressure, or a strange feeling in the chest.  ¨ Sweating. ¨ Shortness of breath. ¨ Nausea or vomiting. ¨ Pain, pressure, or a strange feeling in the back, neck, jaw, or upper belly, or in one or both shoulders or arms. ¨ Lightheadedness or sudden weakness. ¨ A fast or irregular heartbeat. After you call 911, the  may tel you to chew 1 adult-strength or 2 to 4 low-dose aspirin. Wait for an ambulance. Do not try to drive yourself. · You have been diagnosed with angina, and you have symptoms that do not go away with rest or are not getting better within 5 minutes after you take a dose of nitroglycerin. Call your doctor now or seek immediate medical care if:  · You are bleeding from the area where the catheter was put in your artery.   · You have a fast-growing, painful lump at the catheter site.  · You have signs of infection, such as:  ¨ Increased pain, swelling, warmth, or redness. ¨ Red streaks leading from the catheter site. ¨ Pus draining from the catheter site. ¨ A fever. · Your leg or arm looks blue or feels cold, numb, or tingly. These are general instructions for a healthy lifestyle:    No smoking/ No tobacco products/ Avoid exposure to second hand smoke    Surgeon General's Warning:  Quitting smoking now greatly reduces serious risk to your health. Obesity, smoking, and sedentary lifestyle greatly increases your risk for illness    A healthy diet, regular physical exercise & weight monitoring are important for maintaining a healthy lifestyle    You may be retaining fluid if you have a history of heart failure or if you experience any of the following symptoms:  Weight gain of 3 pounds or more overnight or 5 pounds in a week, increased swelling in our hands or feet or shortness of breath while lying flat in bed. Please call your doctor as soon as you notice any of these symptoms; do not wait until your next office visit. Recognize signs and symptoms of STROKE:    F-face looks uneven    A-arms unable to move or move unevenly    S-speech slurred or non-existent    T-time-call 911 as soon as signs and symptoms begin-DO NOT go       Back to bed or wait to see if you get better-TIME IS BRAIN. Warning Signs of HEART ATTACK     Call 911 if you have these symptoms:   Chest discomfort. Most heart attacks involve discomfort in the center of the chest that lasts more than a few minutes, or that goes away and comes back. It can feel like uncomfortable pressure, squeezing, fullness, or pain.  Discomfort in other areas of the upper body. Symptoms can include pain or discomfort in one or both arms, the back, neck, jaw, or stomach.  Shortness of breath with or without chest discomfort.  Other signs may include breaking out in a cold sweat, nausea, or lightheadedness.   Don't wait more than five minutes to call 911 - MINUTES MATTER! Fast action can save your life. Calling 911 is almost always the fastest way to get lifesaving treatment. Emergency Medical Services staff can begin treatment when they arrive -- up to an hour sooner than if someone gets to the hospital by car. The discharge information has been reviewed with the patient. The patient verbalized understanding. Discharge medications reviewed with the patient and appropriate educational materials and side effects teaching were provided. DISCHARGE SUMMARY from Nurse    PATIENT INSTRUCTIONS:    After general anesthesia or intravenous sedation, for 24 hours or while taking prescription Narcotics:  · Limit your activities  · Do not drive and operate hazardous machinery  · Do not make important personal or business decisions  · Do  not drink alcoholic beverages  · If you have not urinated within 8 hours after discharge, please contact your surgeon on call. Report the following to your surgeon:  · Excessive pain, swelling, redness or odor of or around the surgical area  · Temperature over 100.5  · Nausea and vomiting lasting longer than 4 hours or if unable to take medications  · Any signs of decreased circulation or nerve impairment to extremity: change in color, persistent  numbness, tingling, coldness or increase pain  · Any questions    What to do at Home:  Recommended activity: Activity as tolerated and no driving for today. *  Please give a list of your current medications to your Primary Care Provider. *  Please update this list whenever your medications are discontinued, doses are      changed, or new medications (including over-the-counter products) are added. *  Please carry medication information at all times in case of emergency situations.     These are general instructions for a healthy lifestyle:    No smoking/ No tobacco products/ Avoid exposure to second hand smoke  Surgeon General's Warning: Quitting smoking now greatly reduces serious risk to your health. Obesity, smoking, and sedentary lifestyle greatly increases your risk for illness    A healthy diet, regular physical exercise & weight monitoring are important for maintaining a healthy lifestyle    You may be retaining fluid if you have a history of heart failure or if you experience any of the following symptoms:  Weight gain of 3 pounds or more overnight or 5 pounds in a week, increased swelling in our hands or feet or shortness of breath while lying flat in bed. Please call your doctor as soon as you notice any of these symptoms; do not wait until your next office visit. The discharge information has been reviewed with the patient. The patient verbalized understanding. Discharge medications reviewed with the patient and appropriate educational materials and side effects teaching were provided.   ___________________________________________________________________________________________________________________________________

## 2019-09-20 NOTE — Clinical Note
Contrast Dose Calculator:  
Patient's age: 61.  
Patient's sex: Female. Patient weight (kg) = 72.1. Creatinine level (mg/dL) = 0.8. Creatinine clearance (mL/min): 86.  
Contrast concentration (mg/mL) = 300. MACD = 300 mL. Max Contrast dose per Creatinine Cl calculator = 193.5 mL.

## 2019-09-20 NOTE — PROGRESS NOTES
1545 TR Band removed from right radial site. No bleeding or hematoma noted. Pt instructed on proper post cath care. Pt verbalized understanding. Wrist brace reapplied. Will continue to monitor.

## 2019-09-20 NOTE — PROGRESS NOTES
09/20/19 1215   Vitals   Temp 98.1 °F (36.7 °C)   Temp Source Oral   Pulse (Heart Rate) 68   Heart Rate Source Monitor   Resp Rate 12   O2 Sat (%) (!) 75 %   Level of Consciousness Alert   /75   MAP (Monitor) 84   BP 1 Location Left arm   BP 1 Method Automatic   BP Patient Position At rest   MEWS Score 0   Pt AXO, 4brought to [facility] ambulatory. Pt placed in bay 5 and connected to monitor. Baseline VS taken and PIV started x 1. Admission database completed. Pt ready for ordered procedure.

## 2019-09-20 NOTE — PROGRESS NOTES
1357: Pt returned from procedure. Hypotensive. No bleeding from sheath site. Dr. Dueñas Comes aware. 500 cc bolus NS begun. Monitoring pt for DARIUS Booth. Pt has TR band on.     1426: Pt remains hypotensive w/ no side effects. 1453: Pt still hypotensive w/ no symptoms. Pt stable otherwise. 2cc air removed from TR band. No bleeding or hematoma noted. Report given to Maury Lucia RN.     1459: Spoke w/ Dr. Dueñas Comes about continous low BPs. Orders received for 250 bolus and 100 cc/hr drip. Will cont to monitor.

## 2019-09-20 NOTE — Clinical Note
TRANSFER - OUT REPORT:  
 
Verbal report given to: DEVIN Marin. Report consisted of patient's Situation, Background, Assessment and  
Recommendations(SBAR). Opportunity for questions and clarification was provided. Patient transported to: [address]

## 2019-09-20 NOTE — H&P
H&p update    No new symptoms  Patient seen for unstable angina. Risks, benefits and alternatives of LHC/ptca/stent explained to patient.   All questions answered

## 2019-09-20 NOTE — PROGRESS NOTES
09/20/19 1557 09/20/19 1626 09/20/19 1637   Vitals   Pulse (Heart Rate) 68 68 68   Heart Rate Source Monitor Monitor Monitor   Resp Rate 15 17 17   O2 Sat (%) 97 % 97 % 96 %   Level of Consciousness Alert Alert Alert   BP (!) 83/53 (!) 85/56 (!) 86/58   MAP (Monitor) (!) 62 (!) 63 67   BP 1 Location Left arm Left arm Left arm   BP 1 Method Automatic Automatic Automatic   BP Patient Position At rest At rest At rest   1637: Noifed Dr Darby Engle  Regarding low BP, per MD its okayo discharge Pt.  7343: Pt provided discharge instructions. All questions answered and pt verbalized understanding. PIV removed. No hematoma or bleeding noted at this time. Procedure site within normal limits. Pt escorted to front of building for discharge.

## 2019-11-06 ENCOUNTER — OFFICE VISIT (OUTPATIENT)
Dept: CARDIOLOGY CLINIC | Age: 59
End: 2019-11-06

## 2019-11-06 VITALS
WEIGHT: 160 LBS | BODY MASS INDEX: 27.31 KG/M2 | DIASTOLIC BLOOD PRESSURE: 85 MMHG | HEART RATE: 79 BPM | HEIGHT: 64 IN | SYSTOLIC BLOOD PRESSURE: 132 MMHG

## 2019-11-06 DIAGNOSIS — Z82.49 FAMILY HISTORY OF PREMATURE CAD: ICD-10-CM

## 2019-11-06 DIAGNOSIS — I10 ESSENTIAL HYPERTENSION: ICD-10-CM

## 2019-11-06 DIAGNOSIS — I11.9 HYPERTENSIVE HEART DISEASE WITHOUT HEART FAILURE: ICD-10-CM

## 2019-11-06 DIAGNOSIS — R07.9 CHEST PAIN, UNSPECIFIED TYPE: Primary | ICD-10-CM

## 2019-11-06 NOTE — PROGRESS NOTES
HISTORY OF PRESENT ILLNESS  Riri Roa is a 61 y.o. female. Chest Pain (Angina)    The history is provided by the patient. The problem has not changed since onset. The problem occurs every several days. The pain is present in the substernal region. The pain is mild. The quality of the pain is described as pressure-like. The pain radiates to the mid back. Pertinent negatives include no claudication, no cough, no diaphoresis, no dizziness, no fever, no hemoptysis, no nausea, no orthopnea, no palpitations, no PND, no sputum production, no vomiting and no weakness. Risk factors include family history and hypertension. Review of Systems   Constitutional: Negative for chills, diaphoresis, fever and weight loss. HENT: Negative for ear pain and hearing loss. Eyes: Negative for blurred vision. Respiratory: Negative for cough, hemoptysis, sputum production, wheezing and stridor. Cardiovascular: Positive for chest pain. Negative for palpitations, orthopnea, claudication, leg swelling and PND. Gastrointestinal: Negative for heartburn, nausea and vomiting. Musculoskeletal: Negative for myalgias and neck pain. Skin: Negative for rash. Neurological: Negative for dizziness, tingling, tremors, focal weakness, loss of consciousness and weakness. Psychiatric/Behavioral: Negative for depression and suicidal ideas.      Family History   Problem Relation Age of Onset    Heart Disease Mother     Cancer Mother     Hypertension Mother     Diabetes Mother     Heart Disease Father     Stroke Father     Cancer Brother     Hypertension Brother        Past Medical History:   Diagnosis Date    Calculus of kidney     Congestive heart failure, unspecified     Liver disease     Fatty liver       Past Surgical History:   Procedure Laterality Date    BREAST SURGERY PROCEDURE UNLISTED      augmentation    HX COLONOSCOPY      HX DILATION AND CURETTAGE      Multiple    HX GASTRIC BYPASS      HX HEMORRHOIDECTOMY      HX SEPTOPLASTY      HX TONSIL AND ADENOIDECTOMY         Social History     Tobacco Use    Smoking status: Never Smoker    Smokeless tobacco: Never Used   Substance Use Topics    Alcohol use: Yes       Allergies   Allergen Reactions    Tape [Adhesive] Other (comments)     Silk tape blisters       Outpatient Medications Marked as Taking for the 11/6/19 encounter (Office Visit) with Mervin Galo MD   Medication Sig Dispense Refill    buPROPion XL (WELLBUTRIN XL) 300 mg XL tablet Take 300 mg by mouth every morning.  diphenhydramine HCl (BENADRYL PO) Take  by mouth as needed.  carvedilol (COREG) 6.25 mg tablet Take  by mouth two (2) times daily (with meals).  alprazolam (XANAX) 0.25 mg tablet       ergocalciferol (ERGOCALCIFEROL) 50,000 unit capsule       CONTOUR NEXT STRIPS strip       cyanocobalamin (VITAMIN B-12) 2,500 mcg sublingual tablet Take 2,500 mcg by mouth daily.  calcium carbonate (CALTREX) 600 mg (1,500 mg) tablet Take 600 mg by mouth two (2) times a day.  multivitamins chew Take  by mouth. [facility]          Visit Vitals  /85   Pulse 79   Ht 5' 4\" (1.626 m)   Wt 72.6 kg (160 lb)   BMI 27.46 kg/m²     Physical Exam   Constitutional: She is oriented to person, place, and time. She appears well-developed and well-nourished. No distress. HENT:   Head: Atraumatic. Mouth/Throat: No oropharyngeal exudate. Eyes: Conjunctivae are normal. Right eye exhibits no discharge. Left eye exhibits no discharge. No scleral icterus. Neck: Neck supple. No JVD present. No tracheal deviation present. No thyromegaly present. Cardiovascular: Normal rate and regular rhythm. Exam reveals no gallop. No murmur heard. Pulmonary/Chest: Effort normal and breath sounds normal. No stridor. She has no wheezes. She has no rales. Abdominal: Soft. There is no tenderness. There is no rebound. Musculoskeletal: Normal range of motion.  She exhibits no edema or tenderness. Neurological: She is alert and oriented to person, place, and time. She exhibits normal muscle tone. Skin: Skin is warm. She is not diaphoretic. Psychiatric: She has a normal mood and affect. Her behavior is normal.     ekg sinus rhythm with no acute st-t changes  09/20/19   CARDIAC PROCEDURE 09/20/2019 9/20/2019    Narrative Patent epicardial coronary arteries with preserved LV function. Continue intense risk factor modification. Signed by: Alonzo Lebron MD     ASSESSMENT and PLAN    ICD-10-CM ICD-9-CM    1. Chest pain, unspecified type R07.9 786.50 ECHO ADULT COMPLETE   2. Essential hypertension I10 401.9    3. Family history of premature CAD Z82.49 V17.3    4. Hypertensive heart disease without heart failure I11.9 402.90 ECHO ADULT COMPLETE     No orders of the defined types were placed in this encounter. Follow-up and Dispositions    · Return in about 1 month (around 12/6/2019). current treatment plan is effective, no change in therapy. She is a 66-year-old female with long-standing history of hypertension and family history of premature CAD seen for recurrent episodes of chest pain. Underwent cardiac cath which revealed patent epicardial coronary arteries. Known to have moderate left ventricular hypertrophy and grade 1 diastolic dysfunction. Will repeat echo to assess hypertensive heart disease. Follow-up post echo. Will consider switching from Coreg to Hyzaar as needed. Right radial artery intact with no hematoma or ecchymosis.

## 2019-11-06 NOTE — PROGRESS NOTES
1. Have you been to the ER, urgent care clinic since your last visit? Hospitalized since your last visit? No     2. Have you seen or consulted any other health care providers outside of the [address] [place] [address] since your last visit? Include any pap smears or colon screening. Yes Where: PCP     3. Since your last visit, have you had any of the following symptoms? .           4. Have you had any blood work, X-rays or cardiac testing? No         5. Where do you normally have your labs drawn? PCP    6. Do you need any refills today?    No

## 2019-12-11 ENCOUNTER — OFFICE VISIT (OUTPATIENT)
Dept: CARDIOLOGY CLINIC | Age: 59
End: 2019-12-11

## 2019-12-11 VITALS
SYSTOLIC BLOOD PRESSURE: 133 MMHG | HEIGHT: 64 IN | WEIGHT: 159 LBS | HEART RATE: 72 BPM | DIASTOLIC BLOOD PRESSURE: 88 MMHG | BODY MASS INDEX: 27.14 KG/M2

## 2019-12-11 DIAGNOSIS — I11.9 HYPERTENSIVE HEART DISEASE WITHOUT HEART FAILURE: Primary | ICD-10-CM

## 2019-12-11 DIAGNOSIS — Z82.49 FAMILY HISTORY OF PREMATURE CAD: ICD-10-CM

## 2019-12-11 NOTE — PROGRESS NOTES
HISTORY OF PRESENT ILLNESS  Riri Cárdenas is a 61 y.o. female. Chest Pain (Angina)    The history is provided by the patient. The problem has not changed since onset. The problem occurs every several days. The pain is present in the substernal region. The pain is mild. The quality of the pain is described as pressure-like. The pain radiates to the mid back. Pertinent negatives include no claudication, no cough, no diaphoresis, no dizziness, no fever, no hemoptysis, no nausea, no orthopnea, no palpitations, no PND, no sputum production, no vomiting and no weakness. Risk factors include family history and hypertension. Review of Systems   Constitutional: Negative for chills, diaphoresis, fever and weight loss. HENT: Negative for ear pain and hearing loss. Eyes: Negative for blurred vision. Respiratory: Negative for cough, hemoptysis, sputum production, wheezing and stridor. Cardiovascular: Positive for chest pain. Negative for palpitations, orthopnea, claudication, leg swelling and PND. Gastrointestinal: Negative for heartburn, nausea and vomiting. Musculoskeletal: Negative for myalgias and neck pain. Skin: Negative for rash. Neurological: Negative for dizziness, tingling, tremors, focal weakness, loss of consciousness and weakness. Psychiatric/Behavioral: Negative for depression and suicidal ideas.      Family History   Problem Relation Age of Onset    Heart Disease Mother     Cancer Mother     Hypertension Mother     Diabetes Mother     Heart Disease Father     Stroke Father     Cancer Brother     Hypertension Brother        Past Medical History:   Diagnosis Date    Calculus of kidney     Congestive heart failure, unspecified     Liver disease     Fatty liver       Past Surgical History:   Procedure Laterality Date    BREAST SURGERY PROCEDURE UNLISTED      augmentation    HX COLONOSCOPY      HX DILATION AND CURETTAGE      Multiple    HX GASTRIC BYPASS      HX HEMORRHOIDECTOMY      HX SEPTOPLASTY      HX TONSIL AND ADENOIDECTOMY         Social History     Tobacco Use    Smoking status: Never Smoker    Smokeless tobacco: Never Used   Substance Use Topics    Alcohol use: Yes       Allergies   Allergen Reactions    Tape [Adhesive] Other (comments)     Silk tape blisters       Outpatient Medications Marked as Taking for the 12/11/19 encounter (Office Visit) with Tere Tompkins MD   Medication Sig Dispense Refill    buPROPion XL (WELLBUTRIN XL) 300 mg XL tablet Take 300 mg by mouth every morning.  diphenhydramine HCl (BENADRYL PO) Take  by mouth as needed.  carvedilol (COREG) 6.25 mg tablet Take  by mouth two (2) times daily (with meals).  alprazolam (XANAX) 0.25 mg tablet       ergocalciferol (ERGOCALCIFEROL) 50,000 unit capsule       CONTOUR NEXT STRIPS strip       cyanocobalamin (VITAMIN B-12) 2,500 mcg sublingual tablet Take 2,500 mcg by mouth daily.  calcium carbonate (CALTREX) 600 mg (1,500 mg) tablet Take 600 mg by mouth two (2) times a day.  multivitamins chew Take  by mouth. [facility]          Visit Vitals  /88 (BP 1 Location: Right arm, BP Patient Position: Sitting)   Pulse 72   Ht 5' 4\" (1.626 m)   Wt 72.1 kg (159 lb)   BMI 27.29 kg/m²     Physical Exam   Constitutional: She is oriented to person, place, and time. She appears well-developed and well-nourished. No distress. HENT:   Head: Atraumatic. Mouth/Throat: No oropharyngeal exudate. Eyes: Conjunctivae are normal. Right eye exhibits no discharge. Left eye exhibits no discharge. No scleral icterus. Neck: Neck supple. No JVD present. No tracheal deviation present. No thyromegaly present. Cardiovascular: Normal rate and regular rhythm. Exam reveals no gallop. No murmur heard. Pulmonary/Chest: Effort normal and breath sounds normal. No stridor. She has no wheezes. She has no rales. Abdominal: Soft. There is no tenderness. There is no rebound. Musculoskeletal: Normal range of motion. General: No tenderness or edema. Neurological: She is alert and oriented to person, place, and time. She exhibits normal muscle tone. Skin: Skin is warm. She is not diaphoretic. Psychiatric: She has a normal mood and affect. Her behavior is normal.     ekg sinus rhythm with no acute st-t changes  09/20/19   CARDIAC PROCEDURE 09/20/2019 9/20/2019    Narrative Patent epicardial coronary arteries with preserved LV function. Continue intense risk factor modification. Signed by: Donna Brink MD     11/26/19   ECHO ADULT COMPLETE 11/26/2019 11/26/2019    Narrative · Left Ventricle: Normal cavity size. Mild concentric hypertrophy. Low   normal systolic dysfunction. Estimated left ventricular ejection fraction   is 51 - 55%. Abnormal left ventricular wall motion. Mild (grade 1) left   ventricular diastolic dysfunction. · Right Ventricle: Normal right ventricular size and function. · Mitral Valve: Mitral valve thickening. Mild mitral valve regurgitation   is present. · Tricuspid Valve: Mild tricuspid valve regurgitation is present. · Pulmonary Artery: There is no evidence of pulmonary hypertension. Signed by: Ruslan Paz MD     ASSESSMENT and PLAN    ICD-10-CM ICD-9-CM    1. Hypertensive heart disease without heart failure I11.9 402.90    2. Family history of premature CAD Z82.49 V17.3      No orders of the defined types were placed in this encounter. Follow-up and Dispositions    · Return in about 6 months (around 6/11/2020). current treatment plan is effective, no change in therapy. She is a 63-year-old female with long-standing history of hypertension and family history of premature CAD seen for recurrent episodes of chest pain. Underwent cardiac cath which revealed patent epicardial coronary arteries. Known to have moderate left ventricular hypertrophy and grade 1 diastolic dysfunction.       Repeat echo report reviewed with patient. Has moderate left ventricle hypertrophy and grade 1 diastolic dysfunction. We will switch from Coreg to Hyzaar if needed. Meanwhile continue salt restriction and current medications.

## 2019-12-11 NOTE — PROGRESS NOTES
1. Have you been to the ER, urgent care clinic since your last visit? Hospitalized since your last visit? No     2. Have you seen or consulted any other health care providers outside of the [address] [place] [address] since your last visit? Include any pap smears or colon screening. Yes Where: PCP     3. Since your last visit, have you had any of the following symptoms? .          4. Have you had any blood work, X-rays or cardiac testing? 5. Where do you normally have your labs drawn? PCP Office    6. Do you need any refills today?    No

## 2020-05-20 ENCOUNTER — APPOINTMENT (OUTPATIENT)
Dept: GENERAL RADIOLOGY | Age: 60
End: 2020-05-20
Attending: EMERGENCY MEDICINE
Payer: COMMERCIAL

## 2020-05-20 ENCOUNTER — HOSPITAL ENCOUNTER (EMERGENCY)
Age: 60
Discharge: HOME | End: 2020-05-20
Attending: EMERGENCY MEDICINE
Payer: COMMERCIAL

## 2020-05-20 VITALS
TEMPERATURE: 97.5 F | RESPIRATION RATE: 16 BRPM | DIASTOLIC BLOOD PRESSURE: 85 MMHG | HEART RATE: 72 BPM | WEIGHT: 159 LBS | OXYGEN SATURATION: 98 % | BODY MASS INDEX: 27.29 KG/M2 | SYSTOLIC BLOOD PRESSURE: 143 MMHG
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
## 2020-05-20 DIAGNOSIS — S20.20XA CONTUSION OF THORACIC WALL, UNSPECIFIED AREA OF THORACIC WALL, INITIAL ENCOUNTER: ICD-10-CM

## 2020-05-20 DIAGNOSIS — S20.212A CONTUSION OF LEFT CHEST WALL, INITIAL ENCOUNTER: ICD-10-CM

## 2020-05-20 PROCEDURE — 99282 EMERGENCY DEPT VISIT SF MDM: CPT

## 2020-05-20 PROCEDURE — 71100 X-RAY EXAM RIBS UNI 2 VIEWS: CPT

## 2020-05-20 RX ORDER — CYCLOBENZAPRINE HCL 5 MG
5-10 TABLET ORAL
Qty: 22 TAB | Refills: 0 | Status: SHIPPED | OUTPATIENT
Start: 2020-05-20 | End: 2020-05-27

## 2020-05-20 RX ORDER — OXYCODONE AND ACETAMINOPHEN 5; 325 MG/1; MG/1
1 TABLET ORAL
Qty: 14 TAB | Refills: 0 | Status: SHIPPED | OUTPATIENT
Start: 2020-05-20 | End: 2020-05-27

## 2020-05-20 NOTE — ED TRIAGE NOTES
Patient she was shampooing her carpet on May 10 the attachment fell on her and began having let side rib pain on the 12th with bruising. She states pain has not subsided and worse today.
no

## 2020-05-20 NOTE — LETTER
NOTIFICATION RETURN TO WORK / SCHOOL 
 
5/20/2020 10:16 PM 
 
Ms. Eliseo Hussein [facility] [id number] To Whom It May Concern: 
 
Eliseo Hussein is currently under the care of [id number] [place] [address] EMERGENCY DEPT. She will return to work/school on: 5/23/20 If there are questions or concerns please have the patient contact our office.  
 
 
 
Sincerely, 
 
 
Jesse Gordon MD

## 2020-05-21 NOTE — ED PROVIDER NOTES
Pt c/o left sided mid back pain, x 8 days, started w bruising after  fell off steps onto that area 2 days prior. Pain rad to left mid flank/lower chest. no pain at rest, hurts to move and breath. Worsening over the 8 days. No sob. No abd pain, no hematuria. No nausea. No leg pain or swelling. No fever or cough. Taking tylenol, no sig improvement. No x-ray or studies yet. Past Medical History:   Diagnosis Date    Calculus of kidney     Congestive heart failure, unspecified     Liver disease     Fatty liver       Past Surgical History:   Procedure Laterality Date    BREAST SURGERY PROCEDURE UNLISTED      augmentation    HX COLONOSCOPY      HX DILATION AND CURETTAGE      Multiple    HX GASTRIC BYPASS      HX HEMORRHOIDECTOMY      HX SEPTOPLASTY      HX TONSIL AND ADENOIDECTOMY           Family History:   Problem Relation Age of Onset    Heart Disease Mother     Cancer Mother     Hypertension Mother     Diabetes Mother     Heart Disease Father     Stroke Father     Cancer Brother     Hypertension Brother        Social History     Socioeconomic History    Marital status:      Spouse name: Not on file    Number of children: Not on file    Years of education: Not on file    Highest education level: Not on file   Occupational History    Not on file   Social Needs    Financial resource strain: Not on file    Food insecurity     Worry: Not on file     Inability: Not on file    Transportation needs     Medical: Not on file     Non-medical: Not on file   Tobacco Use    Smoking status: Never Smoker    Smokeless tobacco: Never Used   Substance and Sexual Activity    Alcohol use:  Yes    Drug use: No    Sexual activity: Not on file   Lifestyle    Physical activity     Days per week: Not on file     Minutes per session: Not on file    Stress: Not on file   Relationships    Social connections     Talks on phone: Not on file     Gets together: Not on file     Attends Sabianism service: Not on file     Active member of club or organization: Not on file     Attends meetings of clubs or organizations: Not on file     Relationship status: Not on file    Intimate partner violence     Fear of current or ex partner: Not on file     Emotionally abused: Not on file     Physically abused: Not on file     Forced sexual activity: Not on file   Other Topics Concern    Not on file   Social History Narrative    Not on file         ALLERGIES: Tape [adhesive]    Review of Systems   Constitutional: Negative for fever. HENT: Negative for congestion. Respiratory: Negative for cough and shortness of breath. Cardiovascular: Negative for leg swelling. Gastrointestinal: Negative for abdominal pain, nausea and vomiting. Genitourinary: Negative for dysuria and genital sores. Musculoskeletal: Positive for back pain. Skin: Negative for rash. Neurological: Negative for light-headedness. All other systems reviewed and are negative. Vitals:    05/20/20 2000   BP: 143/85   Pulse: 72   Resp: 16   Temp: 97.5 °F (36.4 °C)   SpO2: 98%   Weight: 72.1 kg (159 lb)            Physical Exam  Vitals signs and nursing note reviewed. Constitutional:       Appearance: She is well-developed. She is not diaphoretic. HENT:      Head: Normocephalic and atraumatic. Eyes:      Pupils: Pupils are equal, round, and reactive to light. Neck:      Musculoskeletal: Normal range of motion. Cardiovascular:      Rate and Rhythm: Normal rate and regular rhythm. Heart sounds: No murmur. Pulmonary:      Effort: Pulmonary effort is normal.      Breath sounds: No wheezing. Chest:      Chest wall: Tenderness (mild left lower chest wall ttp. no deformity) present. Abdominal:      Palpations: Abdomen is soft. Tenderness: There is no abdominal tenderness. Musculoskeletal:         General: Tenderness (left lower parathoracic ttp/spasm/eccymosis) present. Skin:     General: Skin is dry. Capillary Refill: Capillary refill takes less than 2 seconds. Findings: No rash. Neurological:      Mental Status: She is alert and oriented to person, place, and time. MDM       Procedures      Vitals:  Patient Vitals for the past 12 hrs:   Temp Pulse Resp BP SpO2   05/20/20 2000 97.5 °F (36.4 °C) 72 16 143/85 98 %         Medications ordered:   Medications - No data to display      Lab findings:  No results found for this or any previous visit (from the past 12 hour(s)). X-Ray, CT or other radiology findings or impressions:  XR RIBS LT UNI 2 V   Final Result   IMPRESSION:   No evidence for displaced left rib fracture. Progress notes, Consult notes or additional Procedure notes:   Pt declines ekg/ct chest as offered. req x-ray and pain tx. Says only pain assoc w the injury. To x-ray and dc per pt. Detailed ret inst given. No emc. Neg x-ray  Pt verb und of ret inst      Diagnosis:   1. Fall, initial encounter    2. Contusion of left chest wall, initial encounter    3. Contusion of thoracic wall, unspecified area of thoracic wall, initial encounter        Disposition: home    Follow-up Information     Follow up With Specialties Details Why Contact Info    Jeremiah Gibbons MD Internal Medicine Schedule an appointment as soon as possible for a visit in 2 days  [facility] [address] DEPT Emergency Medicine Go to As needed [address] [place] [address]  [phone]           Discharge Medication List as of 5/20/2020 10:16 PM      START taking these medications    Details   cyclobenzaprine (FLEXERIL) 5 mg tablet Take 1-2 Tabs by mouth three (3) times daily as needed for Muscle Spasm(s) for up to 7 days. , Print, Disp-22 Tab, R-0      oxyCODONE-acetaminophen (Percocet) 5-325 mg per tablet Take 1 Tab by mouth every six (6) hours as needed for Pain for up to 7 days.  Max Daily Amount: 4 Tabs., Print, Disp-14 Tab, R-0         CONTINUE these medications which have NOT CHANGED    Details   buPROPion XL (WELLBUTRIN XL) 300 mg XL tablet Take 300 mg by mouth every morning., Historical Med      diphenhydramine HCl (BENADRYL PO) Take  by mouth as needed., Historical Med      carvedilol (COREG) 6.25 mg tablet Take  by mouth two (2) times daily (with meals). , Historical Med      alprazolam (XANAX) 0.25 mg tablet Historical Med      ergocalciferol (ERGOCALCIFEROL) 50,000 unit capsule Historical Med      CONTOUR NEXT STRIPS strip Historical Med      cyanocobalamin (VITAMIN B-12) 2,500 mcg sublingual tablet Take 2,500 mcg by mouth daily. , Historical Med      calcium carbonate (CALTREX) 600 mg (1,500 mg) tablet Take 600 mg by mouth two (2) times a day., Historical Med      multivitamins chew Take  by mouth.  Jacky, Historical Med

## 2020-05-21 NOTE — DISCHARGE INSTRUCTIONS
Return for pain, fever not resolving with motrin or tylenol, shortness of breath, vomiting, decreased fluid intake, weakness, numbness, dizziness, or any change or concerns. Patient Education        Chest Contusion: Care Instructions  Your Care Instructions  A chest contusion, or bruise, is caused by a fall or direct blow to the chest. Car crashes, falls, getting punched, and injury from bicycle handlebars are common causes of chest contusions. A very forceful blow to the chest can injure the heart or blood vessels in the chest, the lungs, the airway, the liver, or the spleen. Pain may be caused by an injury to muscles, cartilage, or ribs. Deep breathing, coughing, or sneezing can increase your pain. Lying on the injured area also can cause pain. Follow-up care is a key part of your treatment and safety. Be sure to make and go to all appointments, and call your doctor if you are having problems. It's also a good idea to know your test results and keep a list of the medicines you take. How can you care for yourself at home? · Rest and protect the injured or sore area. Stop, change, or take a break from any activity that may be causing your pain. · Put ice or a cold pack on the area for 10 to 20 minutes at a time. Put a thin cloth between the ice and your skin. · After 2 or 3 days, if your swelling is gone, apply a heating pad set on low or a warm cloth to your chest. Some doctors suggest that you go back and forth between hot and cold. Put a thin cloth between the heating pad and your skin. · Do not wrap or tape your ribs for support. This may cause you to take smaller breaths, which could increase your risk of pneumonia and lung collapse. · Ask your doctor if you can take an over-the-counter pain medicine, such as acetaminophen (Tylenol), ibuprofen (Advil, Motrin), or naproxen (Aleve). Be safe with medicines. Read and follow all instructions on the label. · Take your medicines exactly as prescribed. Call your doctor if you think you are having a problem with your medicine. · Gentle stretching and massage may help you feel better after a few days of rest. Stretch slowly to the point just before discomfort begins, then hold the stretch for at least 15 to 30 seconds. Do this 3 or 4 times per day. · As your pain gets better, slowly return to your normal activities. Be patient, because chest bruises can take weeks or months to heal. Any increased pain may be a sign that you need to rest a while longer. When should you call for help? Call 911 anytime you think you may need emergency care. For example, call if:    · You have severe trouble breathing.     · You cough up blood.    Call your doctor now or seek immediate medical care if:    · You have belly pain.     · You are dizzy or lightheaded, or you feel like you may faint.     · You develop new symptoms with the chest pain.     · Your chest pain gets worse.     · You have a fever.     · You have some shortness of breath.     · You have a cough that brings up mucus from the lungs.    Watch closely for changes in your health, and be sure to contact your doctor if:    · Your chest pain is not improving after 1 week. Where can you learn more? Go to http://temi-audrey.info/  Enter I174 in the search box to learn more about \"Chest Contusion: Care Instructions. \"  Current as of: June 26, 2019Content Version: 12.4  © 7748-7919 Healthwise, Incorporated. Care instructions adapted under license by [employer] (which disclaims liability or warranty for this information). If you have questions about a medical condition or this instruction, always ask your healthcare professional. [address] any warranty or liability for your use of this information.

## 2020-06-10 ENCOUNTER — VIRTUAL VISIT (OUTPATIENT)
Dept: CARDIOLOGY CLINIC | Age: 60
End: 2020-06-10

## 2020-06-10 DIAGNOSIS — I11.9 HYPERTENSIVE HEART DISEASE WITHOUT HEART FAILURE: Primary | ICD-10-CM

## 2020-06-10 DIAGNOSIS — Z82.49 FAMILY HISTORY OF PREMATURE CAD: ICD-10-CM

## 2020-06-10 DIAGNOSIS — I10 ESSENTIAL HYPERTENSION: ICD-10-CM

## 2020-06-10 RX ORDER — ZOLPIDEM TARTRATE 10 MG/1
TABLET ORAL
COMMUNITY

## 2020-06-10 RX ORDER — SERTRALINE HYDROCHLORIDE 100 MG/1
100 TABLET, FILM COATED ORAL EVERY EVENING
COMMUNITY

## 2020-06-10 RX ORDER — IRBESARTAN 75 MG/1
37.5 TABLET ORAL 2 TIMES DAILY
COMMUNITY

## 2020-06-10 NOTE — PROGRESS NOTES
1. Have you been to the ER, urgent care clinic since your last visit? Hospitalized since your last visit? YES bruce       2. Have you seen or consulted any other health care providers outside of the [address] [place] [address] since your last visit? Include any pap smears or colon screening. Yes Where: ortho     3. Since your last visit, have you had any of the following symptoms?  no

## 2020-06-10 NOTE — PATIENT INSTRUCTIONS
Chest Pain: Care Instructions Your Care Instructions There are many things that can cause chest pain. Some are not serious and will get better on their own in a few days. But some kinds of chest pain need more testing and treatment. Your doctor may have recommended a follow-up visit in the next 8 to 12 hours. If you are not getting better, you may need more tests or treatment. Even though your doctor has released you, you still need to watch for any problems. The doctor carefully checked you, but sometimes problems can develop later. If you have new symptoms or if your symptoms do not get better, get medical care right away. If you have worse or different chest pain or pressure that lasts more than 5 minutes or you passed out (lost consciousness), [id number] or seek other emergency help right away. A medical visit is only one step in your treatment. Even if you feel better, you still need to do what your doctor recommends, such as going to all suggested follow-up appointments and taking medicines exactly as directed. This will help you recover and help prevent future problems. How can you care for yourself at home? · Rest until you feel better. · Take your medicine exactly as prescribed. Call your doctor if you think you are having a problem with your medicine. · Do not drive after taking a prescription pain medicine. When should you call for help? [id number]:  
· You passed out (lost consciousness). · You have severe difficulty breathing. · You have symptoms of a heart attack. These may include: 
? Chest pain or pressure, or a strange feeling in your chest. 
? Sweating. ? Shortness of breath. ? Nausea or vomiting. ? Pain, pressure, or a strange feeling in your back, neck, jaw, or upper belly or in one or both shoulders or arms. ? Lightheadedness or sudden weakness. ? A fast or irregular heartbeat.  
After you call 911, the  may tell you to chew 1 adult-strength or 2 to 4 low-dose aspirin. Wait for an ambulance. Do not try to drive yourself. Call your doctor today if:  
· You have any trouble breathing. · Your chest pain gets worse. · You are dizzy or lightheaded, or you feel like you may faint. · You are not getting better as expected. · You are having new or different chest pain. Where can you learn more? Go to http://temi-audrey.info/ Enter A120 in the search box to learn more about \"Chest Pain: Care Instructions. \" Current as of: June 26, 2019               Content Version: 12.5 © 9682-3046 Healthwise, Incorporated. Care instructions adapted under license by [employer] (which disclaims liability or warranty for this information). If you have questions about a medical condition or this instruction, always ask your healthcare professional. [address] any warranty or liability for your use of this information.

## 2020-06-15 NOTE — PROGRESS NOTES
Sarah Sweet is a 61 y.o. female who was seen by synchronous (real-time) audio-video technology on 6/10/2020. Consent:  She and/or her healthcare decision maker is aware that this patient-initiated Telehealth encounter is a billable service, with coverage as determined by her insurance carrier. She is aware that she may receive a bill and has provided verbal consent to proceed: Yes    712  Subjective:   Sarah Sweet was seen for Hypertension (6 month)    Patient is a 63-year-old female with history of hypertension seen for follow-up. Denies shortness of breath or chest pain. No lower extremity edema or orthopnea. Family History   Problem Relation Age of Onset    Heart Disease Mother     Cancer Mother     Hypertension Mother     Diabetes Mother     Heart Disease Father     Stroke Father     Cancer Brother     Hypertension Brother      Past Surgical History:   Procedure Laterality Date    BREAST SURGERY PROCEDURE UNLISTED      augmentation    HX COLONOSCOPY      HX DILATION AND CURETTAGE      Multiple    HX GASTRIC BYPASS      HX HEMORRHOIDECTOMY      HX SEPTOPLASTY      HX TONSIL AND ADENOIDECTOMY       Allergies   Allergen Reactions    Tape [Adhesive] Other (comments)     Silk tape blisters       Current Outpatient Medications:     irbesartan (Avapro) 75 mg tablet, Take 75 mg by mouth nightly., Disp: , Rfl:     zolpidem (Ambien) 10 mg tablet, Take  by mouth nightly as needed for Sleep., Disp: , Rfl:     sertraline (Zoloft) 100 mg tablet, Take  by mouth daily. , Disp: , Rfl:     buPROPion XL (WELLBUTRIN XL) 300 mg XL tablet, Take 150 mg by mouth every morning., Disp: , Rfl:     diphenhydramine HCl (BENADRYL PO), Take  by mouth as needed. , Disp: , Rfl:     carvedilol (COREG) 6.25 mg tablet, Take  by mouth two (2) times daily (with meals). , Disp: , Rfl:     ergocalciferol (ERGOCALCIFEROL) 50,000 unit capsule, , Disp: , Rfl:     CONTOUR NEXT STRIPS strip, , Disp: , Rfl:     cyanocobalamin (VITAMIN B-12) 2,500 mcg sublingual tablet, Take 2,500 mcg by mouth daily. , Disp: , Rfl:     calcium carbonate (CALTREX) 600 mg (1,500 mg) tablet, Take 600 mg by mouth two (2) times a day., Disp: , Rfl:     multivitamins chew, Take  by mouth. Flinstones, Disp: , Rfl:     alprazolam (XANAX) 0.25 mg tablet, , Disp: , Rfl:   Allergies   Allergen Reactions    Tape [Adhesive] Other (comments)     Silk tape blisters         Review of Systems   Review of Systems   Constitutional: Negative for chills and fever. HENT: Negative for nosebleeds. Eyes: Negative for blurred vision and double vision. Respiratory: Negative for cough, hemoptysis, sputum production, shortness of breath and wheezing. Cardiovascular: Negative for chest pain, palpitations, orthopnea, claudication, leg swelling and PND. Gastrointestinal: Negative for abdominal pain, heartburn, nausea and vomiting. Musculoskeletal: Negative for myalgias. Skin: Negative for rash. Neurological: Negative for dizziness, weakness and headaches. Endo/Heme/Allergies: Does not bruise/bleed easily. PHYSICAL EXAMINATION:    Vital Signs: (As obtained by patient/caregiver at home)  There were no vitals taken for this visit.      Constitutional: [x] Appears well-developed and well-nourished [x] No apparent distress      Mental status: [x] Alert and awake  [x] Oriented to person/place/time [x] Able to follow commands        Eyes:   EOM    [x]  Normal    [] Abnormal -   Sclera  [x]  Normal    [] Abnormal -          Discharge [x]  None visible      HENT: [x] Normocephalic, atraumatic  [] Abnormal -  [x] Mouth/Throat: Mucous membranes are moist    External Ears [x] Normal  [] Abnormal -    Neck: [x] No visualized mass,NO JVD [] Abnormal -     Pulmonary/Chest: [x] Respiratory effort normal   [x] No visualized signs of difficulty breathing or respiratory distress        [] Abnormal -     Musculoskeletal:   [x] Normal gait with no signs of ataxia         [x] Normal range of motion of neck        [] Abnormal -    Neurological:        [x] No Facial Asymmetry (Cranial nerve 7 motor function) (limited exam due to video visit)          [x] No gaze palsy        [] Abnormal -        Skin:        [x] No significant exanthematous lesions ,no bruising       [] Abnormal-           Psychiatric:       [x] Normal Affect [] Abnormal -       [x] No Hallucinations  Extremities:           No Edema    Other pertinent observable physical exam findings:-    Pertinent LAB and reports  I have reviewed available  pertinent notes, labs and reports and included in current evaluation of this patient. Assessment & Plan:   Diagnoses and all orders for this visit:    1. Hypertensive heart disease without heart failure    2. Family history of premature CAD    3. Essential hypertension      Patient underwent recent cardiac cath which revealed patent coronary arteries. Currently on Avapro in addition to Coreg. Continue current medication and salt restriction. Follow-up in 6 months. Follow-up and Dispositions    · Return in about 6 months (around 12/10/2020). No orders of the defined types were placed in this encounter. Follow-up and Dispositions    · Return in about 6 months (around 12/10/2020). We discussed the expected course, resolution and complications of the diagnosis(es) in detail. Medication risks, benefits, costs, interactions, and alternatives were discussed as indicated. I advised her to contact the office if her condition worsens, changes or fails to improve as anticipated. She expressed understanding with the diagnosis(es) and plan. I was in the office while conducting this encounter.     Pursuant to the emergency declaration under the [facility], [facility] waiver authority and the [employer] General Act, this Virtual  Visit was conducted, with patient's consent, to reduce the patient's risk of exposure to COVID-19 and provide continuity of care for an established patient. Services were provided through a video synchronous discussion virtually to substitute for in-person clinic visit.     Argentina Luciano MD

## 2021-02-11 ENCOUNTER — TELEPHONE (OUTPATIENT)
Dept: CARDIOLOGY CLINIC | Age: 61
End: 2021-02-11

## 2021-02-11 NOTE — TELEPHONE ENCOUNTER
PER PATIENT, DR BOUCHER WILL BE DONG A CT OF KIDNEY(PT WILL BE PUT TO SLEEP) SCHEDULED 3/15/2021. CARDIAC CLEARANCE REQUESTED.

## 2021-02-11 NOTE — LETTER
2/15/2021 4:27 PM      2/15/2021  [address] [id number]      Dear Dr. Santino Nino:    Re: Fletcher Estuardo   : 1960     Ms. Onelia Paz is cleared from a cardiac standpoint with low risk for CT of kidney (pt will be out to sleep) scheduled on 3/15/2021. If you have any questions or any further assistance is needed please contact our office.     Sincerely,                    Deirdre Mcarthur MD

## 2021-02-15 NOTE — TELEPHONE ENCOUNTER
Letter done. Patient was called and made aware. She voices understanding and acceptance of this advice and will call back if any further questions or concerns.

## 2021-04-01 ENCOUNTER — TELEPHONE (OUTPATIENT)
Dept: CARDIOLOGY CLINIC | Age: 61
End: 2021-04-01

## 2021-12-08 ENCOUNTER — HOSPITAL ENCOUNTER (EMERGENCY)
Age: 61
Discharge: HOME | End: 2021-12-08
Attending: STUDENT IN AN ORGANIZED HEALTH CARE EDUCATION/TRAINING PROGRAM
Payer: COMMERCIAL

## 2021-12-08 ENCOUNTER — APPOINTMENT (OUTPATIENT)
Dept: GENERAL RADIOLOGY | Age: 61
End: 2021-12-08
Attending: EMERGENCY MEDICINE
Payer: COMMERCIAL

## 2021-12-08 VITALS
SYSTOLIC BLOOD PRESSURE: 109 MMHG | RESPIRATION RATE: 18 BRPM | DIASTOLIC BLOOD PRESSURE: 72 MMHG | BODY MASS INDEX: 26.29 KG/M2 | HEIGHT: 64 IN | WEIGHT: 154 LBS | OXYGEN SATURATION: 97 %

## 2021-12-08 DIAGNOSIS — L08.9 INFECTED CUT OF FINGER: Primary | ICD-10-CM

## 2021-12-08 DIAGNOSIS — S61.219A INFECTED CUT OF FINGER: Primary | ICD-10-CM

## 2021-12-08 DIAGNOSIS — M25.542 PAIN INVOLVING JOINT OF FINGER OF LEFT HAND: ICD-10-CM

## 2021-12-08 PROCEDURE — 73130 X-RAY EXAM OF HAND: CPT

## 2021-12-08 PROCEDURE — 99282 EMERGENCY DEPT VISIT SF MDM: CPT

## 2021-12-08 RX ORDER — AMOXICILLIN AND CLAVULANATE POTASSIUM 875; 125 MG/1; MG/1
1 TABLET, FILM COATED ORAL 2 TIMES DAILY
Qty: 10 TABLET | Refills: 0 | Status: SHIPPED | OUTPATIENT
Start: 2021-12-08 | End: 2022-02-08

## 2021-12-08 NOTE — ED TRIAGE NOTES
Pt was screwing in a light bulb last night & it shattered in her left hand. Pt has a swollen left index finger, & pt states it is very painful to move.

## 2021-12-09 NOTE — ED PROVIDER NOTES
EMERGENCY DEPARTMENT HISTORY AND PHYSICAL EXAM      Date: 12/8/2021  Patient Name: Michael Schreiber    History of Presenting Illness     Chief Complaint   Patient presents with    Hand Injury    Hand Pain       History (Context): Michael Schreiber is a 64 y.o. female with a past medical history significant for heart failure and JACKSON cirrhosis comes into the ED today due to left finger injury. Patient states that injury occurred approximately 24 hours ago. She states that while she was attempting to unscrew a light bulb the bulb cracked and cut the palmar left second digit. Laceration found to be in the intertriginous portion of the palmar second and third digits. Patient states she has had significant pain secondary to the injury as well as edema. She states pain is exacerbated with range of motion of the left index finger. Patient denies taking any medication for treatment of her symptoms prior to arrival.  Patient denies any drainage from the laceration. She does states symptoms have worsened since onset. She describes the severity of her symptoms as severe. PCP: Stacey Dorman MD    Current Outpatient Medications   Medication Sig Dispense Refill    amoxicillin-clavulanate (Augmentin) 875-125 mg per tablet Take 1 Tablet by mouth two (2) times a day. 10 Tablet 0    irbesartan (Avapro) 75 mg tablet Take 75 mg by mouth nightly.  zolpidem (Ambien) 10 mg tablet Take  by mouth nightly as needed for Sleep.  sertraline (Zoloft) 100 mg tablet Take  by mouth daily.  buPROPion XL (WELLBUTRIN XL) 300 mg XL tablet Take 150 mg by mouth every morning. (Patient not taking: Reported on 12/8/2021)      diphenhydramine HCl (BENADRYL PO) Take  by mouth as needed. (Patient not taking: Reported on 12/8/2021)      carvedilol (COREG) 6.25 mg tablet Take  by mouth two (2) times daily (with meals).       alprazolam (XANAX) 0.25 mg tablet       ergocalciferol (ERGOCALCIFEROL) 50,000 unit capsule       CONTOUR NEXT STRIPS strip       cyanocobalamin (VITAMIN B-12) 2,500 mcg sublingual tablet Take 2,500 mcg by mouth daily.  calcium carbonate (CALTREX) 600 mg (1,500 mg) tablet Take 600 mg by mouth two (2) times a day. (Patient not taking: Reported on 12/8/2021)      multivitamins chew Take  by mouth. Jacky         Past History     Past Medical History:   Past Medical History:   Diagnosis Date    Calculus of kidney     Congestive heart failure, unspecified     Liver disease     Fatty liver       Past Surgical History:  Past Surgical History:   Procedure Laterality Date    HX COLONOSCOPY      HX DILATION AND CURETTAGE      Multiple    HX GASTRIC BYPASS      HX HEMORRHOIDECTOMY      HX SEPTOPLASTY      HX TONSIL AND ADENOIDECTOMY      IA BREAST SURGERY PROCEDURE UNLISTED      augmentation       Family History:  Family History   Problem Relation Age of Onset    Heart Disease Mother     Cancer Mother     Hypertension Mother     Diabetes Mother     Heart Disease Father     Stroke Father     Cancer Brother     Hypertension Brother        Social History:   Social History     Tobacco Use    Smoking status: Never Smoker    Smokeless tobacco: Never Used   Substance Use Topics    Alcohol use: Yes    Drug use: No       Allergies: Allergies   Allergen Reactions    Tape [Adhesive] Other (comments)     Silk tape blisters       PMH, PSH, family history, social history, allergies reviewed with the patient with significant items noted above. Review of Systems   Review of Systems   Constitutional: Negative for chills and fever. HENT: Negative for sore throat. Eyes: Negative for visual disturbance. Respiratory: Negative for shortness of breath. Cardiovascular: Negative for chest pain. Gastrointestinal: Negative for abdominal pain, nausea and vomiting. Genitourinary: Negative for difficulty urinating. Musculoskeletal: Negative for myalgias.         Left 2nd digit edema Skin: Positive for wound. Negative for rash. Neurological: Negative for headaches. Physical Exam     Vitals:    12/08/21 1547   BP: 109/72   Resp: 18   SpO2: 97%   Weight: 69.9 kg (154 lb)   Height: 5' 4\" (1.626 m)       Physical Exam  Vitals and nursing note reviewed. Constitutional:       General: She is not in acute distress. Appearance: Normal appearance. She is not ill-appearing or toxic-appearing. HENT:      Head: Normocephalic and atraumatic. Mouth/Throat:      Mouth: Mucous membranes are moist.   Eyes:      General: No scleral icterus. Conjunctiva/sclera: Conjunctivae normal.   Cardiovascular:      Rate and Rhythm: Normal rate and regular rhythm. Pulses: Normal pulses. Comments: Normal peripheral perfusion  Pulmonary:      Effort: Pulmonary effort is normal. No respiratory distress. Abdominal:      General: There is no distension. Palpations: Abdomen is soft. Tenderness: There is no abdominal tenderness. Musculoskeletal:         General: Swelling (Circumferential swelling to the left second digit), tenderness (Tenderness to palpation to the palmar aspect of the left second digit as well as along the flexor tendon sheath.) and signs of injury (left 2nd digit) present. No deformity. Normal range of motion. Cervical back: Normal range of motion and neck supple. Comments: Patient's finger appears to be held in slight flexion with pain reproduced with passive extension of the left finger. Skin:     General: Skin is warm and dry. Findings: No erythema or rash. Comments: Small superficial wound to the palmar surface of skin. Found between the intertriginous space of the second and third digit on the left. No active bleeding at this time. No significant erythema   Neurological:      General: No focal deficit present. Mental Status: She is alert and oriented to person, place, and time. Mental status is at baseline.    Psychiatric: Mood and Affect: Mood normal.         Thought Content: Thought content normal.         Diagnostic Study Results     Labs -   No results found for this or any previous visit (from the past 12 hour(s)). Labs Reviewed - No data to display    Radiologic Studies -   XR HAND LT MIN 3 V   Final Result      Negative. CT Results  (Last 48 hours)    None        CXR Results  (Last 48 hours)    None          The laboratory results, imaging results, and other diagnostic exams were reviewed in the EMR. Medical Decision Making   I am the first provider for this patient. I reviewed the vital signs, available nursing notes, past medical history, past surgical history, family history and social history. Vital Signs-Reviewed the patient's vital signs. Records Reviewed: Personally, on initial evaluation    MDM:   Buzzy Drain presents with complaint of left second digit wound  DDX includes but is not limited to: Laceration, flexor tenosynovitis, foreign body    Patient overall well-appearing, no acute distress, vital signs grossly within normal limits. Will obtain imaging  for further evaluation of patients complaint. Will continue to monitor and evaluate patient while in the ED. Orders as below:  Orders Placed This Encounter    XR HAND LT MIN 3 V    amoxicillin-clavulanate (Augmentin) 875-125 mg per tablet        ED Course:    17:38 PM  Patient's x-ray of the hand does not show any acute osseous abnormalities or foreign bodies. Explained the patient and provided shared decision-making for further evaluation of her complaint. Concern for possible tenosynovitis however history abnormal for flexor tenosynovitis presentation at this time. Patient with some physical exam findings making this diagnosis possible. Offered MRI for further evaluation of patients complaint. Patient declining MRI at this time.   Will provide patient with Augmentin for further empiric treatment and was given strict return precautions that if symptoms were worsening or not improving in the next 48 hours to return to the emergency department for further evaluation and need for imaging. Patient verbalized understanding and is without any further questions. Will discharge patient home at this time. Procedures:  Procedures        Diagnosis and Disposition     CLINICAL IMPRESSION:  1. Infected cut of finger    2. Pain involving joint of finger of left hand      Discharge Medication List as of 12/8/2021  5:14 PM      START taking these medications    Details   amoxicillin-clavulanate (Augmentin) 875-125 mg per tablet Take 1 Tablet by mouth two (2) times a day., Normal, Disp-10 Tablet, R-0         CONTINUE these medications which have NOT CHANGED    Details   irbesartan (Avapro) 75 mg tablet Take 75 mg by mouth nightly., Historical Med      zolpidem (Ambien) 10 mg tablet Take  by mouth nightly as needed for Sleep., Historical Med      sertraline (Zoloft) 100 mg tablet Take  by mouth daily. , Historical Med      buPROPion XL (WELLBUTRIN XL) 300 mg XL tablet Take 150 mg by mouth every morning., Historical Med      diphenhydramine HCl (BENADRYL PO) Take  by mouth as needed., Historical Med      carvedilol (COREG) 6.25 mg tablet Take  by mouth two (2) times daily (with meals). , Historical Med      alprazolam (XANAX) 0.25 mg tablet Historical Med      ergocalciferol (ERGOCALCIFEROL) 50,000 unit capsule Historical Med      CONTOUR NEXT STRIPS strip Historical Med      cyanocobalamin (VITAMIN B-12) 2,500 mcg sublingual tablet Take 2,500 mcg by mouth daily. , Historical Med      calcium carbonate (CALTREX) 600 mg (1,500 mg) tablet Take 600 mg by mouth two (2) times a day., Historical Med      multivitamins chew Take  by mouth. Flinstones, Historical Med             Disposition: Home    Patient condition at time of disposition: Stable    DISCHARGE NOTE:   Pt has been reexamined. Patient has no new complaints, changes, or physical findings. Care plan outlined and precautions discussed. Results were reviewed with the patient. All medications were reviewed with the patient. All of pt's questions and concerns were addressed. Alarm symptoms and return precautions associated with chief complaint and evaluation were reviewed with the patient in detail. The patient demonstrated adequate understanding. Patient was instructed to follow up with PCP, as well as strict return precautions to the ED upon further deterioration. Patient is ready to go home. The patient is happy with this plan      Dragon Disclaimer     Please note that this dictation was completed with Flowline, the computer voice recognition software. Quite often unanticipated grammatical, syntax, homophones, and other interpretive errors are inadvertently transcribed by the computer software. Please disregard these errors. Please excuse any errors that have escaped final proofreading. Christoph SCALES.

## 2022-02-10 PROBLEM — R10.2 PELVIC PAIN IN FEMALE: Status: ACTIVE | Noted: 2022-02-10

## 2022-09-07 ENCOUNTER — OFFICE VISIT (OUTPATIENT)
Dept: ORTHOPEDIC SURGERY | Age: 62
End: 2022-09-07
Payer: COMMERCIAL

## 2022-09-07 VITALS
BODY MASS INDEX: 27.31 KG/M2 | HEART RATE: 81 BPM | WEIGHT: 160 LBS | HEIGHT: 64 IN | OXYGEN SATURATION: 98 % | TEMPERATURE: 97.7 F

## 2022-09-07 DIAGNOSIS — R22.32 MASS OF LEFT UPPER EXTREMITY: ICD-10-CM

## 2022-09-07 DIAGNOSIS — M25.512 LEFT SHOULDER PAIN, UNSPECIFIED CHRONICITY: ICD-10-CM

## 2022-09-07 DIAGNOSIS — M89.8X1 PAIN OF LEFT SCAPULA: ICD-10-CM

## 2022-09-07 DIAGNOSIS — M19.019 AC JOINT ARTHROPATHY: Primary | ICD-10-CM

## 2022-09-07 PROCEDURE — 99203 OFFICE O/P NEW LOW 30 MIN: CPT | Performed by: ORTHOPAEDIC SURGERY

## 2022-09-07 PROCEDURE — 20611 DRAIN/INJ JOINT/BURSA W/US: CPT | Performed by: ORTHOPAEDIC SURGERY

## 2022-09-07 RX ORDER — MELOXICAM 15 MG/1
15 TABLET ORAL
Qty: 30 TABLET | Refills: 1 | Status: CANCELLED | OUTPATIENT
Start: 2022-09-07

## 2022-09-07 RX ORDER — TRIAMCINOLONE ACETONIDE 40 MG/ML
40 INJECTION, SUSPENSION INTRA-ARTICULAR; INTRAMUSCULAR ONCE
Status: COMPLETED | OUTPATIENT
Start: 2022-09-07 | End: 2022-09-07

## 2022-09-07 RX ORDER — MELOXICAM 15 MG/1
15 TABLET ORAL
Qty: 30 TABLET | Refills: 1 | Status: SHIPPED | OUTPATIENT
Start: 2022-09-07

## 2022-09-07 RX ORDER — BACLOFEN 10 MG/1
10 TABLET ORAL 2 TIMES DAILY
Qty: 60 TABLET | Refills: 1 | Status: SHIPPED | OUTPATIENT
Start: 2022-09-07

## 2022-09-07 RX ADMIN — TRIAMCINOLONE ACETONIDE 40 MG: 40 INJECTION, SUSPENSION INTRA-ARTICULAR; INTRAMUSCULAR at 15:37

## 2022-09-07 NOTE — PROGRESS NOTES
Ángelmarla Steve  1960   Chief Complaint   Patient presents with    Shoulder Pain        HISTORY OF PRESENT ILLNESS  Riri Early is a 58 y.o. female who presents today for evaluation of left shoulder pain. She rates her pain 3/10 today. Pain has been present for 1 year. No specific injury but she was helping take care of her mother who had cancer which required a lot of lifting and pulling. Had MRI performed last year. She feels like the pain is spreading upwards to her neck. Also notes a lump on the inside of her left elbow, denies any changes in size but is tender to touch. Also reports left-sided scapular pain and pulling sensation in her neck. Endorses night pain from her shoulder. Has tried applying Lidocaine patches. Has tried following treatments: Injections:NO; Brace:NO;  Therapy:NO; Cane/Crutch:NO       Allergies   Allergen Reactions    Tape [Adhesive] Other (comments)     Silk tape blisters        Past Medical History:   Diagnosis Date    Bladder mass     Calculus of kidney     Cancer (HCC)     Congestive heart failure, unspecified     COVID-19 vaccine series completed     3 DOSES    Hypertension     Liver disease     Fatty liver    Nausea & vomiting     Psychiatric disorder     ANXIETY    Sphincter of Oddi dysfunction     Uterine prolapse       Social History     Socioeconomic History    Marital status:      Spouse name: Not on file    Number of children: Not on file    Years of education: Not on file    Highest education level: Not on file   Occupational History    Not on file   Tobacco Use    Smoking status: Never    Smokeless tobacco: Never   Substance and Sexual Activity    Alcohol use: Yes    Drug use: No    Sexual activity: Not on file   Other Topics Concern    Not on file   Social History Narrative    Not on file     Social Determinants of Health     Financial Resource Strain: Not on file   Food Insecurity: Not on file   Transportation Needs: Not on file Physical Activity: Not on file   Stress: Not on file   Social Connections: Not on file   Intimate Partner Violence: Not on file   Housing Stability: Not on file      Past Surgical History:   Procedure Laterality Date    HX ABDOMINOPLASTY      HX CHOLECYSTECTOMY      HX COLONOSCOPY      HX DILATION AND CURETTAGE      Multiple    HX GASTRIC BYPASS      HX GYN      3 VAGINAL BIRTH    HX HEENT  10/2021    ORAL SURGERY- WITH BROKEN METAL IN JAW    HX HEMORRHOIDECTOMY      HX SEPTOPLASTY      HX TONSIL AND ADENOIDECTOMY      HX UROLOGICAL      CYSTO BLADDER BX    UT BREAST SURGERY PROCEDURE UNLISTED      augmentation      Family History   Problem Relation Age of Onset    Heart Disease Mother     Cancer Mother     Hypertension Mother     Diabetes Mother     Heart Disease Father     Stroke Father     Cancer Brother     Hypertension Brother       Current Outpatient Medications   Medication Sig    baclofen (LIORESAL) 10 mg tablet Take 1 Tablet by mouth two (2) times a day. amLODIPine (NORVASC) 2.5 mg tablet Take  by mouth daily. raloxifene (EVISTA) 60 mg tablet Take  by mouth daily. PT INSTRUCTED TO VERIFY WITH SURGEON IF OR WHEN TO STOP  Indications: post-menopausal osteoporosis prevention    irbesartan (AVAPRO) 75 mg tablet Take 37.5 mg by mouth two (2) times a day. zolpidem (AMBIEN) 10 mg tablet Take  by mouth nightly as needed for Sleep. 5-10MG    sertraline (ZOLOFT) 100 mg tablet Take 100 mg by mouth every evening. carvediloL (COREG) 6.25 mg tablet Take  by mouth two (2) times daily (with meals). ergocalciferol (ERGOCALCIFEROL) 50,000 unit capsule every seven (7) days. CONTOUR NEXT STRIPS strip     cyanocobalamin (VITAMIN B12) 2,500 mcg sublingual tablet Take 2,500 mcg by mouth daily. calcium carbonate (CALTREX) 600 mg (1,500 mg) tablet Take 600 mg by mouth two (2) times a day. multivitamins chew Take  by mouth. [facility]     No current facility-administered medications for this visit. REVIEW OF SYSTEM   Patient denies: Weight loss, Fever/Chills, HA, Visual changes, Fatigue, Chest pain, SOB, Abdominal pain, N/V/D/C, Blood in stool or urine, Edema. Pertinent positive as above in HPI. All others were negative    PHYSICAL EXAM:   Visit Vitals  Pulse 81   Temp 97.7 °F (36.5 °C) (Temporal)   Ht 5' 4\" (1.626 m)   Wt 160 lb (72.6 kg)   SpO2 98%   BMI 27.46 kg/m²     The patient is a well-developed, well-nourished female   in no acute distress. The patient is alert and oriented times three. The patient is alert and oriented times three. Mood and affect are normal.  LYMPHATIC: lymph nodes are not enlarged and are within normal limits  SKIN: normal in color and non tender to palpation. There are no bruises or abrasions noted. NEUROLOGICAL: Motor sensory exam is within normal limits. Reflexes are equal bilaterally.  There is normal sensation to pinprick and light touch  MUSCULOSKELETAL:  Examination Left shoulder   Skin Intact   AC joint tenderness +   Biceps tenderness -   Forward flexion/Elevation    Active abduction    Glenohumeral abduction 90   External rotation ROM 60   Internal rotation ROM 45   Apprehension -   Linettes Relocation -   Jerk -   Load and Shift -   Obriens -   Speeds -   Impingement sign +   Supraspinatus/Empty Can -, 5/5   External Rotation Strength -, 5/5   Lift Off/Belly Press -, 5/5   Neurovascular Intact        Examination Neck   Skin Intact   Tenderness, Paracervical +   Paracervical spasms  +   Flexion Decreased 25%   Extension Decreased 25%   Lateral bend left Normal   Lateral bend right Normal   Masses -   Spurling sign -   Biceps reflex Normal   Triceps reflex Normal   Brachioradialis reflex Normal   Sensation Normal   Soft tissue mass over left elbow 3cm in diameter    PROCEDURE:   Left AC Joint Injection with Ultrasound Guidance    Indication:Left  AC Joint  pain/swelling    After sterile prep, 1 cc of Xylocaine and 1 cc of Kenalog were injected into the left  AC joint . Intra-articular Ultrasound images captured using [address] [place] [address] Loop Ultrasound machine using a frequency of 10 MHz with a linear transducer and scanned into patient's chart. [facility]  OFFICE PROCEDURE PROGRESS NOTE        Chart reviewed for the following:  Kiara Matthew M.D, have reviewed the History, Physical and updated the Allergic reactions for [id number] [address] performed immediately prior to start of procedure:  Kiara Matthew M.D, have performed the following reviews on [address] [place] [address] prior to the start of the procedure:            * Patient was identified by name and date of birth   * Agreement on procedure being performed was verified  * Risks and Benefits explained to the patient  * Procedure site verified and marked as necessary  * Patient was positioned for comfort  * Needle placement confirmed by ultrasound  * Consent was signed and verified     Time: 3:29 PM     Date of procedure: 9/7/2022    Procedure performed by:  Natty Banerjee M.D    Provider assisted by: (see medication administration)    How tolerated by patient: tolerated the procedure well with no complications    Comments: none      IMAGING: XR of the left shoulder with 3 views obtained in the office dated 9/07/2022 was reviewed and read by Dr. Marie Yip: Mild degenerative changes in the [facility] joint      MRI of left shoulder from [facility] dated 7/13/2021 was reviewed and read by Dr. Marie Yip:   IMPRESSION:  Low-grade interstitial tearing of the supraspinatus and infraspinatus tendons  Moderate acromioclavicular joint osteoarthritis        IMPRESSION:      ICD-10-CM ICD-9-CM    1. AC joint arthropathy  M19.019 719.91 baclofen (LIORESAL) 10 mg tablet      REFERRAL TO PHYSICAL THERAPY      triamcinolone acetonide (KENALOG-40) 40 mg/mL injection 40 mg      ARTHROCENTESIS ASPIR&/INJ MAJOR JT/BURSA W/US      2.  Left shoulder pain, unspecified chronicity  M25.512 719.41 AMB POC XRAY, SHOULDER; COMPLETE, 2+      3. Pain of left scapula  M89.8X1 733.90 baclofen (LIORESAL) 10 mg tablet      REFERRAL TO PHYSICAL THERAPY      4. Mass of left upper extremity  R22.32 782.2            PLAN:  1. Pt presents today with left scapular pain and left shoulder pain c/w AC joint arthropathy and I would like to try injecting the left AC joint with cortisone. Will be referring to PT for left shoulder and scapula with dry needling and was provided with prescriptions for Mobic and Baclofen. She also presents with mass of left elbow c/w possible lipoma and will be ordering MRI of left elbow with and without contrast for further evaluation of mass. Patient was provided with physician-directed home exercise program in the office today. Risk factors include: htn  2. No ultrasound exam indicated today  3. Yes cortisone injection indicated today L AC JOINT US  4. Yes Physical/Occupational Therapy indicated today  5. Yes diagnostic test indicated today: MRI L ELBOW W WO CONT  6. No durable medical equipment indicated today  7. No referral indicated today   8. Yes medications indicated today: BACLOFEN & MOBIC  9. No Narcotic indicated today       RTC following MRI      Scribed by Regan Lonnie Barbara [facility]) as dictated by Gerri Doshi MD    I, Dr. Gerri Doshi, confirm that all documentation is accurate.     Gerri Doshi M.D.   [facility]

## 2022-09-08 ENCOUNTER — TELEPHONE (OUTPATIENT)
Dept: PHYSICAL THERAPY | Age: 62
End: 2022-09-08

## 2022-09-20 ENCOUNTER — HOSPITAL ENCOUNTER (OUTPATIENT)
Dept: PHYSICAL THERAPY | Age: 62
Discharge: HOME | End: 2022-09-20
Attending: ORTHOPAEDIC SURGERY
Payer: COMMERCIAL

## 2022-09-20 PROCEDURE — 97162 PT EVAL MOD COMPLEX 30 MIN: CPT

## 2022-09-20 PROCEDURE — 97530 THERAPEUTIC ACTIVITIES: CPT

## 2022-09-20 PROCEDURE — 97110 THERAPEUTIC EXERCISES: CPT

## 2022-09-20 NOTE — PROGRESS NOTES
[facility]  [address]  [place], [address]  [phone] fax    Plan of Care/ Statement of Necessity for Physical Therapy Services    Patient name: Lavern Guillen Start of Care: 2022   Referral source: Conrad Shows,* : 1960    Medical Diagnosis: Post-traumatic osteoarthritis, other specified site [M19.19]  Other specified disorders of bone, shoulder [M89.8X1]  Payor: BLUE CROSS / Plan: [address] [facility] / Product Type: PPO /  Onset Date: May 2021    Treatment Diagnosis: Left shoulder pain   Prior Hospitalization: see medical history Provider#: [id number]   Medications: Verified on Patient summary List    Comorbidities: Hysterectomy, Allergies, Arthritis, back pain   Prior Level of Function: The patient reports she had improved ease of reaching overhead and sleeping prior to onset. The Plan of Care and following information is based on the information from the initial evaluation. 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 The patient has had an MRI which showed some small RTC tears as well as moderate AC joint arthritis. She demonstrates painful AROM into flexion/ABD as well as weakness into flexion/ABD. She is point tender through her UT/LS regions of the left side. The patient has signs and symptoms consistent with [facility] joint pain and mechanical cervical pain with impairments consisting of pain, decreased ROM, decreased flexibility, decreased strength, limited ADL ease. The patient will benefit from skilled PT in order to improve ease of ADLs.      Evaluation Complexity History MEDIUM  Complexity : 1-2 comorbidities / personal factors will impact the outcome/ POC ; Examination MEDIUM Complexity : 3 Standardized tests and measures addressing body structure, function, activity limitation and / or participation in recreation  ;Presentation MEDIUM Complexity : Evolving with changing characteristics  ; Clinical Decision Making MEDIUM Complexity : FOTO score of 26-74  Overall Complexity Rating: MEDIUM  Problem List: pain affecting function, decrease ROM, decrease strength, decrease ADL/ functional abilitiies, decrease activity tolerance, and decrease flexibility/ joint mobility   Treatment Plan may include any combination of the following: Therapeutic exercise, Therapeutic activities, Neuromuscular re-education, Physical agent/modality, Manual therapy, Patient education, Self Care training, and Functional mobility training  Patient / Family readiness to learn indicated by: asking questions, trying to perform skills, and interest  Persons(s) to be included in education: patient (P)  Barriers to Learning/Limitations: None  Patient Goal (s): less pain  Patient Self Reported Health Status: good  Rehabilitation Potential: good    Short Term Goals: To be accomplished in 2 weeks:   1. The patient will demonstrate independence and compliance with HEP to maximize therapeutic benefit. 2. The patient will improve cervical rotation to 62 degrees to improve ease of driving bilaterally. Long Term Goals: To be accomplished in 4 weeks:   1. The patient will improve FOTO score to 60 to improve ease of ADLs. 2. The patient will improve AROM of left shoulder to 145 degrees to improve ease of reaching into overhead cabinets. 3. The patient will improve flexion strength to 4+/5 MMT to improve ease of ADLs. 4. The patient will report 75% improvement since onset to improve ease of ADLs. Frequency / Duration: Patient to be seen 2 times per week for 4 weeks.     Patient/ Caregiver education and instruction: Diagnosis, prognosis, self care, activity modification, and exercises   [x]  Plan of care has been reviewed with FADI Angulo, PT 9/20/2022 4:30 PM    ________________________________________________________________________    I certify that the above Therapy Services are being furnished while the patient is under my care. I agree with the treatment plan and certify that this therapy is necessary.     [de-identified] Signature:____________Date:_________TIME:________     Lilia Eva,*  ** Signature, Date and Time must be completed for valid certification **    Please sign and return to [facility] [address] [place] [address] & [address]  [address] [place] [address] [address]  [phone] fax

## 2022-09-20 NOTE — PROGRESS NOTES
PT DAILY TREATMENT NOTE     Patient Name: Katharina Farmer  Date:2022  : 1960  [x]  Patient  Verified  Payor: BLUE CROSS / Plan: [employer] [facility] / Product Type: PPO /    In time: 3:45  Out time:4:30  Total Treatment Time (min): 45  Visit #: 1 of 8    Medicare/BCBS Only   Total Timed Codes (min):  23 1:1 Treatment Time:  23       Treatment Area: Post-traumatic osteoarthritis, other specified site [M19.19]  Other specified disorders of bone, shoulder [M89.8X1]    SUBJECTIVE  Pain Level (0-10 scale): 3/10  Any medication changes, allergies to medications, adverse drug reactions, diagnosis change, or new procedure performed?: [x] No    [] Yes (see summary sheet for update)  Subjective functional status/changes:   [] No changes reported  The patient reports that she has a chief complaint of left shoulder pain and left sided neck pain that does extend into her shoulder blade 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 OBJECTIVE  15 min Therapeutic Exercise:  [] See flow sheet :   Rationale: increase ROM and increase strength to improve the patients ability to improve ADL ease. 8 min Therapeutic Activity:  []  See flow sheet : education regarding biomechanics regarding relationship between UT/LS and shoulder upon pathological movement pattern. Discussion regarding activities to avoid and compensation patterns which cause exacerbate pain. Discussion/education of posture. Rationale: increase ROM and increase strength  to improve the patients ability to improve ADL ease.         With   [] TE   [] TA   [] neuro   [] other: Patient Education: [x] Review HEP    [] Progressed/Changed HEP based on:   [] positioning   [] body mechanics   [] transfers   [] heat/ice application    [] other:      Other Objective/Functional Measures: See IE     Pain Level (0-10 scale) post treatment: 3/10    ASSESSMENT/Changes in Function: See POC    Patient will continue to benefit from skilled PT services to modify and progress therapeutic interventions, address functional mobility deficits, address ROM deficits, address strength deficits, analyze and address soft tissue restrictions, analyze and cue movement patterns, analyze and modify body mechanics/ergonomics, assess and modify postural abnormalities, and instruct in home and community integration to attain remaining goals. []  See Plan of Care  []  See progress note/recertification  []  See Discharge Summary         Progress towards goals / Updated goals:  Short Term Goals: To be accomplished in 2 weeks:               1. The patient will demonstrate independence and compliance with HEP to maximize therapeutic benefit. IE: issued HEP               2. The patient will improve cervical rotation to 62 degrees to improve ease of driving bilaterally. IE: 48 right rotation, 54 left rotation  Long Term Goals: To be accomplished in 4 weeks:               1. The patient will improve FOTO score to 60 to improve ease of ADLs. IE: 39                2. The patient will improve AROM of left shoulder to 145 degrees to improve ease of reaching into overhead cabinets. IE: 135 degrees flexion               3. The patient will improve flexion strength to 4+/5 MMT to improve ease of ADLs. IE: 4/5 MMT               4. The patient will report 75% improvement since onset to improve ease of ADLs.     IE: 0%    PLAN  [x]  Upgrade activities as tolerated     []  Continue plan of care  []  Update interventions per flow sheet       []  Discharge due to:_  []  Other:_      Cristina Carter, PT 9/20/2022  4:44 PM    Future Appointments   Date Time Provider Talon Duggan   9/28/2022  1:45 PM [facility]   9/30/2022  2:30 PM [facility]   9/30/2022  5:30 PM HBV MRI RM 1 [facility]   10/5/2022  1:45 PM [facility]   10/7/2022  2:30 PM Saundra Guillory, PT [facility]   10/12/2022  1:10 PM Berna Woodson MD [facility]   10/12/2022  2:30 PM Veatrice Boys, PT [facility]   10/19/2022  1:45 PM Veatrice Boys, PT [facility]   10/21/2022  2:30 PM Veatrice Boys, PT [facility]   10/26/2022  1:45 PM Veatrice Boys, PT [facility]   10/28/2022  1:00 PM Veatrice Boys, PT [facility]

## 2022-09-21 ENCOUNTER — APPOINTMENT (OUTPATIENT)
Dept: PHYSICAL THERAPY | Age: 62
End: 2022-09-21
Attending: ORTHOPAEDIC SURGERY
Payer: COMMERCIAL

## 2022-09-28 ENCOUNTER — HOSPITAL ENCOUNTER (OUTPATIENT)
Dept: PHYSICAL THERAPY | Age: 62
Discharge: HOME | End: 2022-09-28
Attending: ORTHOPAEDIC SURGERY
Payer: COMMERCIAL

## 2022-09-28 PROCEDURE — 97014 ELECTRIC STIMULATION THERAPY: CPT

## 2022-09-28 PROCEDURE — 97112 NEUROMUSCULAR REEDUCATION: CPT

## 2022-09-28 PROCEDURE — 97110 THERAPEUTIC EXERCISES: CPT

## 2022-09-28 NOTE — PROGRESS NOTES
PT DAILY TREATMENT NOTE     Patient Name: Miguelina Palumbo  Date:2022  : 1960  [x]  Patient  Verified  Payor: BLUE CROSS / Plan: [employer] [facility] / Product Type: PPO /    In time:1:45  Out time:2:28  Total Treatment Time (min): 43  Visit #: 2 of 8    Medicare/BCBS Only   Total Timed Codes (min):  43 1:1 Treatment Time:  43     Treatment Area: Pain in left shoulder [M25.512]    SUBJECTIVE  Pain Level (0-10 scale): 0/10  Any medication changes, allergies to medications, adverse drug reactions, diagnosis change, or new procedure performed?: [x] No    [] Yes (see summary sheet for update)  Subjective functional status/changes:   [] No changes reported  \"I can sleep better now and I'm not waking up with as much pain. \"    OBJECTIVE  Modality rationale: decrease pain and increase tissue extensibility to improve the patients ability to improve ADL ease. Min Type Additional Details   6'+2' [x] Estim: [x]Att     [x]w/US   []w/ice   []w/heat  Position: seated  Location:    [] Skin assessment post-treatment:  []intact []redness- no adverse reaction    []redness - adverse reaction:     20 min Therapeutic Exercise:  [x] See flow sheet :   Rationale: increase ROM and increase strength to improve the patients ability to improve ADL ease. 15 min Neuromuscular Re-education:  [x]  See flow sheet :   Rationale: increase ROM and increase strength  to improve the patients ability to improve ADL ease. With   [] TE   [] TA   [] neuro   [] other: Patient Education: [x] Review HEP    [] Progressed/Changed HEP based on:   [] positioning   [] body mechanics   [] transfers   [] heat/ice application    [] other:      Other Objective/Functional Measures: The patient reports compliance with HEP. Pain Level (0-10 scale) post treatment: 0/10    ASSESSMENT/Changes in Function: Cues required to perform scapulothoracic strengthening interventions correctly.  She completed the session without increase in pain and left with all questions answered void of pain. Patient will continue to benefit from skilled PT services to modify and progress therapeutic interventions, address functional mobility deficits, address ROM deficits, address strength deficits, analyze and address soft tissue restrictions, analyze and cue movement patterns, analyze and modify body mechanics/ergonomics, assess and modify postural abnormalities, and instruct in home and community integration to attain remaining goals. []  See Plan of Care  []  See progress note/recertification  []  See Discharge Summary         Progress towards goals / Updated goals:  Short Term Goals: To be accomplished in 2 weeks:               1. The patient will demonstrate independence and compliance with HEP to maximize therapeutic benefit. IE: issued HEP   Current: Met - demonstrates I and compliance with HEP. 9/28/2022               2. The patient will improve cervical rotation to 62 degrees to improve ease of driving bilaterally. IE: 48 right rotation, 54 left rotation  Long Term Goals: To be accomplished in 4 weeks:               1. The patient will improve FOTO score to 60 to improve ease of ADLs. IE: 39                2. The patient will improve AROM of left shoulder to 145 degrees to improve ease of reaching into overhead cabinets. IE: 135 degrees flexion               3. The patient will improve flexion strength to 4+/5 MMT to improve ease of ADLs. IE: 4/5 MMT               4. The patient will report 75% improvement since onset to improve ease of ADLs.                IE: 0%    PLAN  [x]  Upgrade activities as tolerated     []  Continue plan of care  []  Update interventions per flow sheet       []  Discharge due to:_  []  Other:_      Cesia Smith PT 9/28/2022  1:55 PM    Future Appointments   Date Time Provider Talon Duggan   9/30/2022  2:30 PM Dawna Magana, PT [facility]   9/30/2022  5:30 PM [facility] MRI RM 1 [facility]   10/5/2022  1:45 PM Jitendra Guillory, PT [facility]   10/7/2022  2:30 PM Jitenrda Guillory, PT [facility]   10/12/2022  1:10 PM Vincent Delatorre MD [facility]   10/12/2022  2:30 PM Jitendra Guillory, PT [facility]   10/19/2022  1:45 PM Jitendra Guillory, PT [facility]   10/21/2022  1:30 PM Lisette Du [facility]   10/26/2022  1:45 PM Jitendra Guillory, PT [facility]   10/28/2022 12:00 PM Lisette Du [facility]

## 2022-09-30 ENCOUNTER — APPOINTMENT (OUTPATIENT)
Dept: PHYSICAL THERAPY | Age: 62
End: 2022-09-30
Attending: ORTHOPAEDIC SURGERY
Payer: COMMERCIAL

## 2022-10-05 ENCOUNTER — APPOINTMENT (OUTPATIENT)
Dept: PHYSICAL THERAPY | Age: 62
End: 2022-10-05
Attending: ORTHOPAEDIC SURGERY
Payer: COMMERCIAL

## 2022-10-07 ENCOUNTER — TELEPHONE (OUTPATIENT)
Dept: PHYSICAL THERAPY | Age: 62
End: 2022-10-07

## 2022-10-07 ENCOUNTER — APPOINTMENT (OUTPATIENT)
Dept: PHYSICAL THERAPY | Age: 62
End: 2022-10-07
Attending: ORTHOPAEDIC SURGERY
Payer: COMMERCIAL

## 2022-10-12 ENCOUNTER — OFFICE VISIT (OUTPATIENT)
Dept: ORTHOPEDIC SURGERY | Age: 62
End: 2022-10-12
Payer: COMMERCIAL

## 2022-10-12 ENCOUNTER — HOSPITAL ENCOUNTER (OUTPATIENT)
Dept: PHYSICAL THERAPY | Age: 62
Discharge: HOME | End: 2022-10-12
Attending: ORTHOPAEDIC SURGERY
Payer: COMMERCIAL

## 2022-10-12 DIAGNOSIS — R22.32 MASS OF LEFT UPPER EXTREMITY: ICD-10-CM

## 2022-10-12 DIAGNOSIS — M19.019 AC JOINT ARTHROPATHY: ICD-10-CM

## 2022-10-12 DIAGNOSIS — M89.8X1 PAIN OF LEFT SCAPULA: Primary | ICD-10-CM

## 2022-10-12 PROCEDURE — 97112 NEUROMUSCULAR REEDUCATION: CPT

## 2022-10-12 PROCEDURE — 97140 MANUAL THERAPY 1/> REGIONS: CPT

## 2022-10-12 PROCEDURE — 97032 APPL MODALITY 1+ESTIM EA 15: CPT

## 2022-10-12 PROCEDURE — 97110 THERAPEUTIC EXERCISES: CPT

## 2022-10-12 PROCEDURE — 99213 OFFICE O/P EST LOW 20 MIN: CPT | Performed by: ORTHOPAEDIC SURGERY

## 2022-10-12 NOTE — PROGRESS NOTES
Alonso Mckeon  1960   No chief complaint on file. HISTORY OF PRESENT ILLNESS  Riri Silva is a 58 y.o. female who presents today for reevaluation of left shoulder. Patient rates pain as 4/10 today. Pain has been present for 1 year. No specific injury but she was helping take care of her mother who had cancer which required a lot of lifting and pulling. Had MRI performed last year. She feels like the pain is spreading upwards to her neck. Also notes a lump on the inside of her left elbow, denies any changes in size but is tender to touch. She also states she has a similar lump near her left wrist. Also reports left-sided scapular pain and pulling sensation in her neck. Endorses night pain from her shoulder. Has tried applying Lidocaine patches. At last OV on 9/07/2022, patient had a left AC joint cortisone injection which provided some relief. Was also prescribed Baclofen and Mobic at last OV. Was referred to PT at last OV however it has been hard to attend PT because she lives 4 hours away. A left elbow MRI was ordered at last OV however she has not been able to obtain the MRI. Patient denies any fever, chills, chest pain, shortness of breath or calf pain. The remainder of the review of systems is negative. There are no new illness or injuries to report since last seen in the office. There are no changes to medications, allergies, family or social history. PHYSICAL EXAM:   There were no vitals taken for this visit. The patient is a well-developed, well-nourished female   in no acute distress. The patient is alert and oriented times three. The patient is alert and oriented times three. Mood and affect are normal.  LYMPHATIC: lymph nodes are not enlarged and are within normal limits  SKIN: normal in color and non tender to palpation. There are no bruises or abrasions noted. NEUROLOGICAL: Motor sensory exam is within normal limits. Reflexes are equal bilaterally.  There is normal sensation to pinprick and light touch  MUSCULOSKELETAL:  Examination Left shoulder   Skin Intact   AC joint tenderness +   Biceps tenderness -   Forward flexion/Elevation    Active abduction    Glenohumeral abduction 90   External rotation ROM 60   Internal rotation ROM 45   Apprehension -   Linettes Relocation -   Jerk -   Load and Shift -   Obriens -   Speeds -   Impingement sign +   Supraspinatus/Empty Can -, 5/5   External Rotation Strength -, 5/5   Lift Off/Belly Press -, 5/5   Neurovascular Intact         Examination Neck   Skin Intact   Tenderness, Paracervical +   Paracervical spasms  +   Flexion Decreased 25%   Extension Decreased 25%   Lateral bend left Normal   Lateral bend right Normal   Masses -   Spurling sign -   Biceps reflex Normal   Triceps reflex Normal   Brachioradialis reflex Normal   Sensation Normal   Soft tissue mass over left elbow 3cm in diameter        IMAGING: XR of the left shoulder with 3 views obtained in the office dated 9/07/2022 was reviewed and read by Dr. Arnold Lozada: Mild degenerative changes in the [facility] joint        MRI of left shoulder from [facility] dated 7/13/2021 was reviewed and read by Dr. Arnold Lozada:   IMPRESSION:  Low-grade interstitial tearing of the supraspinatus and infraspinatus tendons  Moderate acromioclavicular joint osteoarthritis    IMPRESSION:      ICD-10-CM ICD-9-CM    1. Pain of left scapula  M89.8X1 733.90       2. AC joint arthropathy  M19.019 719.91       3. Mass of left upper extremity  R22.32 782.2            PLAN:   1. . Pt presents today with left scapular pain and left shoulder pain c/w AC joint arthropathy. The left AC joint cortisone injection provided some relief however she is still having left-sided scapular pain and she is requesting cervical spine MRI which was ordered today.  She also presents with mass of left elbow and we will try reordering left elbow MRI with and without IV contrast. She can follow up via virtual visit to review MRIs. Risk factors include: htn  2. No ultrasound exam indicated today  3. No cortisone injection indicated today   4. No Physical/Occupational Therapy indicated today  5. Yes diagnostic test indicated today: MRI L ELBOW W WO CONT & MRI CERVICAL SPINE  6. No durable medical equipment indicated today  7. No referral indicated today   8. No medications indicated today:   9. No Narcotic indicated today       RTC following MRIs via virtual visit      Scribed by Jf Florez) as dictated by Sindhu Cowart MD    I, Dr. Sindhu Cowart, confirm that all documentation is accurate.     Sindhu Cowart M.D.   [facility]

## 2022-10-12 NOTE — PROGRESS NOTES
PT DAILY TREATMENT NOTE     Patient Name: Tess Sims  Date:10/12/2022  : 1960  [x]  Patient  Verified  Payor: BLUE CROSS / Plan: [employer] [facility] [place] / Product Type: PPO /    In time: 2:31  Out time:3:36  Total Treatment Time (min): 65  Visit #: 3 of 8    Medicare/BCBS Only   Total Timed Codes (min):  55 1:1 Treatment Time:  55       Treatment Area: Pain in left shoulder [M25.512]    SUBJECTIVE  Pain Level (0-10 scale): 4/10  Any medication changes, allergies to medications, adverse drug reactions, diagnosis change, or new procedure performed?: [x] No    [] Yes (see summary sheet for update)  Subjective functional status/changes:   [] No changes reported  The patient reports increased pain levels last week. She states that the left side of her neck has increased into the left side of her neck and she did get some headaches from this. OBJECTIVE    Modality rationale: decrease pain and increase tissue extensibility to improve the patients ability to improve ADL ease. Min Type Additional Details   10 []  Ice     [x]  heat  []  Ice massage  []  Laser   []  Anodyne Position: supine  Location: cervical spine   [] Skin assessment post-treatment:  []intact []redness- no adverse reaction    []redness - adverse reaction:     15 min Therapeutic Exercise:  [x] See flow sheet :   Rationale: increase ROM and increase strength to improve the patients ability to improve ADL ease. 12 min Neuromuscular Re-education:  [x]  See flow sheet :   Rationale: increase ROM and increase strength  to improve the patients ability to improve ADL ease. 12 min Therapeutic Activity:  [x]  See flow sheet : education regarding proper positioning with neutral posture in the evenings to avoid forward head. Rationale:  postural education   to improve the patients ability to perform ADLs.       8 min Manual Therapy:  SOR, left down-glides with the patient in supine   The manual therapy interventions were performed at a separate and distinct time from the therapeutic activities interventions. Rationale: decrease pain, increase ROM, and increase tissue extensibility to improve ADL ease. Modality rationale: decrease pain and increase tissue extensibility to improve the patients ability to improve ADL ease. Min Type Additional Details   8' [x] Estim: [x]Att    []TENS instruct  []NMES                    []Other:  [x]w/US   []w/ice   [x]w/heat  Position:  Location:   [] Skin assessment post-treatment:  []intact []redness- no adverse reaction    []redness - adverse reaction:     With   [] TE   [] TA   [] neuro   [] other: Patient Education: [x] Review HEP    [] Progressed/Changed HEP based on:   [] positioning   [] body mechanics   [] transfers   [] heat/ice application    [] other:      Other Objective/Functional Measures:   Issued updated HEP including rows, extensions, ER     Pain Level (0-10 scale) post treatment: 2/10    ASSESSMENT/Changes in Function: The patient experienced good pain control, though had exacerbation last week after prepping for the storm. She was able to complete session today and denied increase in pain post session. Patient will continue to benefit from skilled PT services to modify and progress therapeutic interventions, address functional mobility deficits, address ROM deficits, address strength deficits, analyze and address soft tissue restrictions, analyze and cue movement patterns, analyze and modify body mechanics/ergonomics, assess and modify postural abnormalities, and instruct in home and community integration to attain remaining goals. []  See Plan of Care  []  See progress note/recertification  []  See Discharge Summary         Progress towards goals / Updated goals:  Short Term Goals: To be accomplished in 2 weeks:               1. The patient will demonstrate independence and compliance with HEP to maximize therapeutic benefit.               IE: issued HEP Current: Met - demonstrates I and compliance with HEP. 9/28/2022               2. The patient will improve cervical rotation to 62 degrees to improve ease of driving bilaterally. IE: 48 right rotation, 54 left rotation  Long Term Goals: To be accomplished in 4 weeks:               1. The patient will improve FOTO score to 60 to improve ease of ADLs. IE: 39                2. The patient will improve AROM of left shoulder to 145 degrees to improve ease of reaching into overhead cabinets. IE: 135 degrees flexion               3. The patient will improve flexion strength to 4+/5 MMT to improve ease of ADLs. IE: 4/5 MMT               4. The patient will report 75% improvement since onset to improve ease of ADLs.                IE: 0%        PLAN  [x]  Upgrade activities as tolerated     []  Continue plan of care  []  Update interventions per flow sheet       []  Discharge due to:_  []  Other:_      Idania Mauricio PT 10/12/2022  3:29 PM    Future Appointments   Date Time Provider Talon Olga Lidia   10/19/2022  1:45 PM Christ Ahumada PT [facility]   10/21/2022  1:30 PM Ismael [address] [facility]   10/26/2022  1:45 [facility]   10/28/2022 12:00 PM Kena No [facility]

## 2022-10-19 ENCOUNTER — HOSPITAL ENCOUNTER (OUTPATIENT)
Dept: PHYSICAL THERAPY | Age: 62
Discharge: HOME | End: 2022-10-19
Attending: ORTHOPAEDIC SURGERY
Payer: COMMERCIAL

## 2022-10-19 PROCEDURE — 97112 NEUROMUSCULAR REEDUCATION: CPT

## 2022-10-19 PROCEDURE — 97110 THERAPEUTIC EXERCISES: CPT

## 2022-10-19 NOTE — PROGRESS NOTES
[facility]  [address]  [place]), [address]  [phone] fax    Physical Therapy Progress Note  Patient name: Miguelina Palumbo Start of Care: 2022   Referral source: Jamaica Borges,* : 1960                Medical Diagnosis: Post-traumatic osteoarthritis, other specified site [M19.19]  Other specified disorders of bone, shoulder [M89.8X1]  Payor: BLUE CROSS / Plan: [facility] [place] / Product Type: PPO /  Onset Date: May 2021                Treatment Diagnosis: Left shoulder pain   Prior Hospitalization: see medical history Provider#: [id number]   Medications: Verified on Patient summary List    Comorbidities: Hysterectomy, Allergies, Arthritis, back pain   Prior Level of Function: The patient reports she had improved ease of reaching overhead and sleeping prior to onset. Visits from Start of Care: 4    Missed Visits: 0      Established Goals:        Excellent         Good         Limited            None  [x] Increased ROM   []  [x]  []  []  [x] Increased Strength  []  [x]  []  []  [x] Increased Mobility  []  [x]  []  []   [x] Decreased Pain   []  [x]  []  []    Key Functional Changes:   Right rotation: 50 degrees  Left rotation: 57 degrees     Shoulder flexion AROM: 157 degrees     FOTO: 56    Short Term Goals: To be accomplished in 2 weeks:               1. The patient will demonstrate independence and compliance with HEP to maximize therapeutic benefit. IE: issued HEP              Current: Met - demonstrates I and compliance with HEP. 2022               2. The patient will improve cervical rotation to 62 degrees to improve ease of driving bilaterally. IE: 48 right rotation, 55 left rotation              Current: No significant change   Long Term Goals: To be accomplished in 4 weeks:               1. The patient will improve FOTO score to 60 to improve ease of ADLs.               IE: 39 Current: Progressed to 56               2. The patient will improve AROM of left shoulder to 145 degrees to improve ease of reaching into overhead cabinets. IE: 135 degrees flexion              Current: Met - 157 degrees               3. The patient will improve flexion strength to 4+/5 MMT to improve ease of ADLs. IE: 4/5 MMT              Current: Met 4+/5 MMT               4. The patient will report 75% improvement since onset to improve ease of ADLs. IE: 0%    Updated Goals: to be achieved in 4 weeks:     1. The patient will improve FOTO score to 60 to improve ease of ADLs. Progressed to 56                2. The patient will improve ER strength 4/5 MMT to improve ease of ADLs. PN: mid trap strength: 3+/5 MMT               4. The patient will report 75% improvement since onset to improve ease of ADLs. IE: 0%   Current: NT    ASSESSMENT/RECOMMENDATIONS:  The patient has made good progress regarding left shoulder AROM noting 157 degrees flexion, her FOTO score improved to 56 indicating improved quality of life, though her cervical rotation remains limited, thus added SNAGs today for progression. She will continue to benefit from PT in order to address remaining deficits.       [x]Continue therapy per initial plan/protocol at a frequency of  2 x per week for 4 weeks  []Continue therapy with the following recommended changes:_____________________      _____________________________________________________________________  []Discontinue therapy progressing towards or have reached established goals  []Discontinue therapy due to lack of appreciable progress towards goals  []Discontinue therapy due to lack of attendance or compliance  []Await Physician's recommendations/decisions regarding therapy  []Other:________________________________________________________________    Thank you for this referral.    Yen Minor, PT 10/19/2022 3:38 PM  NOTE TO PHYSICIAN:  PLEASE COMPLETE THE ORDERS BELOW AND   FAX TO [facility]: [phone]  If you are unable to process this request in 24 hours please contact our office: [phone]    I have read the above report and request that my patient continue as recommended. I have read the above report and request that my patient continue therapy with the following changes/special instructions:__________________________________________________________  I have read the above report and request that my patient be discharged from therapy.     Physicians signature: ______________________________Date: ______Time:______     Le Castellanos,*

## 2022-10-19 NOTE — PROGRESS NOTES
PT DAILY TREATMENT NOTE     Patient Name: Sandie Maria  Date:10/19/2022  : 1960  [x]  Patient  Verified  Payor: BLUE CROSS / Plan: [address] [place] [address] [place] / Product Type: PPO /    In time:1:44  Out time:2:44  Total Treatment Time (min): 60  Visit #: 4 of 8    Medicare/BCBS Only   Total Timed Codes (min):  50 1:1 Treatment Time:  50       Treatment Area: Pain in left shoulder [M25.512]    SUBJECTIVE  Pain Level (0-10 scale): 2.5/10  Any medication changes, allergies to medications, adverse drug reactions, diagnosis change, or new procedure performed?: [x] No    [] Yes (see summary sheet for update)  Subjective functional status/changes:   [] No changes reported  The patient reports improvement in pain since last session. She states that her muscle in her upper shoulder region was twitching some, though. OBJECTIVE  Modality rationale: decrease pain and increase tissue extensibility to improve the patients ability to improve ADL ease. Min Type Additional Details   6+2 [x] Estim: [x]Att    []TENS instruct  []NMES                    []Other:  [x]w/US   []w/ice   []w/heat  Position: seated  Location: left UT/LS region   [] Skin assessment post-treatment:  []intact []redness- no adverse reaction    []redness - adverse reaction:     32 min Therapeutic Exercise:  [x] See flow sheet :   Rationale: increase ROM and increase strength to improve the patients ability to improve ADL ease. 10 min Neuromuscular Re-education:  []  See flow sheet :   Rationale: increase ROM and increase strength  to improve the patients ability to improve ADL ease.         With   [] TE   [] TA   [] neuro   [] other: Patient Education: [x] Review HEP    [] Progressed/Changed HEP based on:   [] positioning   [] body mechanics   [] transfers   [] heat/ice application    [] other:      Other Objective/Functional Measures:   Right rotation: 50 degrees  Left rotation: 57 degrees    Shoulder flexion AROM: 157 degrees    FOTO: 56    Pain Level (0-10 scale) post treatment: 2/10    ASSESSMENT/Changes in Function: The patient has made good progress regarding left shoulder AROM noting 157 degrees flexion, her FOTO score improved to 56 indicating improved quality of life, though her cervical rotation remains limited, thus added SNAGs today for progression. She will continue to benefit from PT in order to address remaining deficits. Patient will continue to benefit from skilled PT services to modify and progress therapeutic interventions, address functional mobility deficits, address ROM deficits, address strength deficits, analyze and address soft tissue restrictions, analyze and cue movement patterns, analyze and modify body mechanics/ergonomics, assess and modify postural abnormalities, and instruct in home and community integration to attain remaining goals. []  See Plan of Care  []  See progress note/recertification  []  See Discharge Summary         Progress towards goals / Updated goals:  Short Term Goals: To be accomplished in 2 weeks:               1. The patient will demonstrate independence and compliance with HEP to maximize therapeutic benefit. IE: issued HEP              Current: Met - demonstrates I and compliance with HEP. 9/28/2022               2. The patient will improve cervical rotation to 62 degrees to improve ease of driving bilaterally. IE: 48 right rotation, 55 left rotation   Current: No significant change   Long Term Goals: To be accomplished in 4 weeks:               1. The patient will improve FOTO score to 60 to improve ease of ADLs. IE: 41    Current: Progressed to 56               2. The patient will improve AROM of left shoulder to 145 degrees to improve ease of reaching into overhead cabinets.               IE: 135 degrees flexion   Current: Met - 157 degrees               3. The patient will improve flexion strength to 4+/5 MMT to improve ease of ADLs.              IE: 4/5 MMT   Current: Met 4+/5 MMT               4. The patient will report 75% improvement since onset to improve ease of ADLs.                IE: 0%    PLAN  [x]  Upgrade activities as tolerated     []  Continue plan of care  []  Update interventions per flow sheet       []  Discharge due to:_  []  Other:_      Cesia Smith, PT 10/19/2022  1:56 PM    Future Appointments   Date Time Provider Talon Peñai   10/21/2022  1:30 PM Ismael [address] [facility]   10/26/2022  1:45 PM Carisa Guillory, PT [facility]   10/28/2022 12:00 PM Vanessa Mulberry [facility]   10/31/2022 12:45 PM [facility] RM 1 [facility]   10/31/2022  1:30 PM [facility] MRI RM 1 [facility]

## 2022-10-21 ENCOUNTER — APPOINTMENT (OUTPATIENT)
Dept: PHYSICAL THERAPY | Age: 62
End: 2022-10-21
Attending: ORTHOPAEDIC SURGERY
Payer: COMMERCIAL

## 2022-10-21 ENCOUNTER — HOSPITAL ENCOUNTER (OUTPATIENT)
Dept: PHYSICAL THERAPY | Age: 62
Discharge: HOME | End: 2022-10-21
Attending: ORTHOPAEDIC SURGERY
Payer: COMMERCIAL

## 2022-10-21 PROCEDURE — 97110 THERAPEUTIC EXERCISES: CPT

## 2022-10-21 PROCEDURE — 97140 MANUAL THERAPY 1/> REGIONS: CPT

## 2022-10-21 PROCEDURE — 20560 NDL INSJ W/O NJX 1 OR 2 MUSC: CPT

## 2022-10-21 NOTE — PROGRESS NOTES
PT DAILY TREATMENT NOTE     Patient Name: Ximena Rivera  Date:10/21/2022  : 1960  [x]  Patient  Verified  Payor: CHARMAINE HORAN / Plan: Haley Castellano / Product Type: PPO /    In time:1:36  Out time:2:16  Total Treatment Time (min): 40  Visit #: 1 of 8    Medicare/BCBS Only   Total Timed Codes (min):  33 1:1 Treatment Time:  33       Treatment Area: Pain in left shoulder [M25.512]    SUBJECTIVE  Pain Level (0-10 scale): 3  Any medication changes, allergies to medications, adverse drug reactions, diagnosis change, or new procedure performed?: [x] No    [] Yes (see summary sheet for update)  Subjective functional status/changes:   [] No changes reported  The pt reports she had to move some things yesterday so she is a little sore.  Wants to trial dry needling    OBJECTIVE    Modality rationale: decrease inflammation to improve the patients ability to perform ADLs with less post needling soreness   Min Type Additional Details    [] Estim:  []Unatt       []IFC  []Premod                        []Other:  []w/ice   []w/heat  Position:  Location:    [] Estim: []Att    []TENS instruct  []NMES                    []Other:  []w/US   []w/ice   []w/heat  Position:  Location:    []  Traction: [] Cervical       []Lumbar                       [] Prone          []Supine                       []Intermittent   []Continuous Lbs:  [] before manual  [] after manual    []  Ultrasound: []Continuous   [] Pulsed                           []1MHz   []3MHz W/cm2:  Location:    []  Iontophoresis with dexamethasone         Location: [] Take home patch   [] In clinic   5 [x]  Ice     []  heat  []  Ice massage  []  Laser   []  Anodyne Position:seated   Location: left UT/LS    []  Laser with stim  []  Other:  Position:  Location:    []  Vasopneumatic Device    []  Right     []  Left  Pre-treatment girth:  Post-treatment girth:  Measured at (location):  Pressure:       [] lo [] med [] hi   Temperature: [] lo [] med [] hi [] Skin assessment post-treatment:  []intact []redness- no adverse reaction    []redness - adverse reaction:     10 min Therapeutic Exercise:  [] See flow sheet :   Rationale: increase strength and increase proprioception to improve the patients ability to perform functional tasks with improved mechanics    23 min Manual Therapy:  IMDN palpation, setup and hemostasis, T/S PAs grade II-III, STM left UE with passive SB in supine   The manual therapy interventions were performed at a separate and distinct time from the therapeutic activities interventions. Rationale: decrease pain and increase tissue extensibility to improve ease of ADLs    2 min Dry Needling:   [x]  CPT 14955:  needle insertion(s) without injection(s); 1 or 2 muscle(s)  []  CPT 87991:  needle insertion(s) without injection(s); 3 or more muscles. Rationale: decrease pain, increase tissue extensibility, and decrease trigger points to improve mechanics with ADLs    Dry Needling Procedure Note    Procedure: An intramuscular manual therapy (dry needling) and a neuro-muscular re-education treatment was done to deactivate myofascial trigger points with a 30 gauge filament needle under aseptic technique. Indications:  [] Myofascial pain and dysfunction [] Muscled spasms  [] Myalgia/myositis   [] Muscle cramps  [] Muscle imbalances  [] Temporomandibular Dysfunction  [] Other:    Chart reviewed for the following:  Josh DASH, have reviewed the medical history, summary list and precautions/contraindications for [employer].   TIME OUT performed immediately prior to start of procedure:  Josh DASH, have performed the following reviews on [employer] prior to the start of the session:      [x] Verified patient identification by name and date of birth    [x] Agreement on all muscles being treated was verified   [x] Purpose of dry needling, side effects, possible complications, risks and benefits were explained to the patient   [x] Procedure site(s) verified  [x] Patient was positioned for comfort and draped for privacy  [x] Informed Consent was signed (initial visit) and verified verbally (subsequent visits)  [x] Patient was instructed on the need to report the use of blood thinners and/or immunosuppressant medications  [x] How to respond to possible adverse effects of treatment  [x] Self treatment of post needling soreness: ice, heat (moist heat, heat wraps) and stretching  [x] Opportunity was given to ask any questions, all questions were answered            Time: 1:45  Date of procedure: 10/21/2022  Treatment: The following muscles were treated today with intramuscular dry needling  [] Left [] Right Masster  [] Left [] Right Temporalis  [] Left [] Right Zygomaticus Major / Minor  [] Left [] Right Lateral Pterygoid  [] Left [] Right Medial Pterygoid  [] Left [] Right Digastric Post / Anterior Belly  [] Left [] Right Sternocleidomastoid  [] Left [] Right Scalene Anterior / Medial / Posterior  [] Left [] Right Extra Laryngeal Muscles  [x] Left [] Right Upper Trapezius  [] Left [] Right Middle Trapezius  [] Left [] Right Lower Trapezius  [] Left [] Right Oblique Capitis Inferior  [] Left [] Right Splenius Capitis / Cervicis  [] Left [] Right Semispinalis: Capitis / Cervicis  [] Left [] Right Multifidi / Rotatores Cervicis / Thoracic  [] Left [] Right Longissimus Thoracis / Illiocostalis  [x] Left [] Right Levator Scapulae  [] Left [] Right Supraspinatus / Infraspinatus  [] Left [] Right Teres Major / Minor  [] Left [] Right Rhomboids / Serratus posterior superior  [] Left [] Right Pectoralis Major / Minor  [] Left [] Right Serratus Anterior  [] Left [] Right Latissimus Dorsi  [] Left [] Right Subscapularis  [] Left [] Right Coracobrachialis  [] Left [] Right Biceps Brachii  [] Left [] Right Deltoid: Anterior / Medial / Posterior  [] Left [] Right Brachialis  [] Left [] Right Triceps  [] Left [] Right Brachioradialis  [] Left [] Right Extensor Carpi Radialis Brevis / Extensor Carpi Radialis Longus    [] Left [] Right  Extensor digitorum  [] Left [] Right Supinator / Pronator Teres  [] Left [] Right Flexor Carpi Radialis/ Flexor Carpi Ulnaris   [] Left [] Right  Flexor Digitorum Superficialis/ Flexor Digitorum Profundus  [] Left [] Right Flexor Pollicis Longus / Flexor Pollicis Brevis / Palmaris Longus  [] Left [] Right Abductor Pollicis Longus / Abductor Pollicis Brevis  [] Left [] Right Opponens Pollicis / Adductor Pollicis  [] Left [] Right Dorsal / Palmar Interossei / Lumbricalis  [] Left [] Right Abductor Digiti Minimi / Opponens Digiti Minimi    Patient's response to today's treatment:  [] Latent Twitch Response     [] Muscle relaxation [] Pain Relief  [] Post needling soreness    [] without complications  [] Increased Range of Motion   [] Decreased headaches    [] Decreased Tinnitus  [] Other:     Performed by: Alexandra Huertas DPT CMTPT            With   [] TE   [] TA   [] neuro   [] other: Patient Education: [x] Review HEP    [] Progressed/Changed HEP based on:   [] positioning   [] body mechanics   [] transfers   [] heat/ice application    [] other:      Other Objective/Functional Measures:      Pain Level (0-10 scale) post treatment: 2-3    ASSESSMENT/Changes in Function: Pt with excellent response to DN today. Reports minimal post needling soreness today, educated on post needling care and responses to monitor. Will assess any improvements at next visit    Patient will continue to benefit from skilled PT services to modify and progress therapeutic interventions, address functional mobility deficits, address ROM deficits, address strength deficits, analyze and address soft tissue restrictions, analyze and cue movement patterns, and analyze and modify body mechanics/ergonomics to attain remaining goals.      []  See Plan of Care  []  See progress note/recertification  []  See Discharge Summary         Updated Goals: to be achieved in 4 weeks:                1. The patient will improve FOTO score to 60 to improve ease of ADLs. Progressed to 56                2. The patient will improve ER strength 4/5 MMT to improve ease of ADLs. PN: mid trap strength: 3+/5 MMT               4. The patient will report 75% improvement since onset to improve ease of ADLs.                IE: 0%              Current: NT    PLAN  []  Upgrade activities as tolerated     []  Continue plan of care  []  Update interventions per flow sheet       []  Discharge due to:_  []  Other:_      Andres Hodges DPT CMTPT 10/21/2022  1:36 PM    Future Appointments   Date Time Provider Talon Duggan   10/26/2022  1:45 PM Janette Triplett PT [facility]   10/28/2022 12:00 PM Henreitta Essex [facility]   10/31/2022 12:45 PM [facility] 1 [facility]   10/31/2022  1:30 PM [facility] RM 1 [facility]

## 2022-10-26 ENCOUNTER — HOSPITAL ENCOUNTER (OUTPATIENT)
Dept: PHYSICAL THERAPY | Age: 62
Discharge: HOME | End: 2022-10-26
Attending: ORTHOPAEDIC SURGERY
Payer: COMMERCIAL

## 2022-10-26 PROCEDURE — 97112 NEUROMUSCULAR REEDUCATION: CPT

## 2022-10-26 PROCEDURE — 97140 MANUAL THERAPY 1/> REGIONS: CPT

## 2022-10-26 PROCEDURE — 97110 THERAPEUTIC EXERCISES: CPT

## 2022-10-26 NOTE — PROGRESS NOTES
PT DAILY TREATMENT NOTE     Patient Name: Tess Sims  Date:10/26/2022  : 1960  [x]  Patient  Verified  Payor: BLUE CROSS / Plan: "[employer] [facility] [place] / Product Type: PPO /    In time:1:46  Out time:2:43  Total Treatment Time (min): 62  Visit #: 2 of 8    Medicare/BCBS Only   Total Timed Codes (min):  47 1:1 Treatment Time:  52       Treatment Area: Pain in left shoulder [M25.512]    SUBJECTIVE  Pain Level (0-10 scale): 4/10  Any medication changes, allergies to medications, adverse drug reactions, diagnosis change, or new procedure performed?: [x] No    [] Yes (see summary sheet for update)  Subjective functional status/changes:   [] No changes reported  The patient has a chief complaint of left sided neck pain     OBJECTIVE  Modality rationale: decrease pain and increase tissue extensibility to improve the patients ability to improve ADL ease. Min Type Additional Details   10 []  Ice     [x]  heat  []  Ice massage  []  Laser   []  Anodyne Position: supine   Location: cervical spine   [] Skin assessment post-treatment:  []intact []redness- no adverse reaction    []redness - adverse reaction:     24 min Therapeutic Exercise:  [] See flow sheet :   Rationale: increase ROM and increase strength to improve the patients ability to improve ADL ease. 15 min Neuromuscular Re-education:  [x]  See flow sheet :   Rationale: increase ROM and increase strength  to improve the patients ability to improve ADL ease. 8 min Manual Therapy:  MFR and down-gliding of left cervical paraspinal, UT/LS MFR, gentle cervical PROM and stretching of UT/LS in supine. The manual therapy interventions were performed at a separate and distinct time from the therapeutic activities interventions. Rationale: decrease pain, increase ROM, and increase tissue extensibility to improve ADL ease.        Modality rationale: decrease pain and increase tissue extensibility to improve the patients ability to improve ADL ease. Min Type Additional Details   8+2' = 10 [x] Estim: [x]Att    []TENS instruct  []NMES                    []Other:  [x]w/US   []w/ice   []w/heat  Position:  Location:   [] Skin assessment post-treatment:  []intact []redness- no adverse reaction    []redness - adverse reaction:     With   [] TE   [] TA   [] neuro   [] other: Patient Education: [x] Review HEP    [] Progressed/Changed HEP based on:   [] positioning   [] body mechanics   [] transfers   [] heat/ice application    [] other:      Other Objective/Functional Measures:      Pain Level (0-10 scale) post treatment: 2/10    ASSESSMENT/Changes in Function: Regressed interventions session today due to increased pain reported at initiation of session. She was able to complete session and did have a decrease in pain, but most pain appeared to be within levator scapulae region as well as left UT. Recommend continue PT to progress scapular strengthening as tolerated and to attempt to return to interventions once again next session. Patient will continue to benefit from skilled PT services to modify and progress therapeutic interventions, address functional mobility deficits, address ROM deficits, address strength deficits, analyze and address soft tissue restrictions, analyze and cue movement patterns, analyze and modify body mechanics/ergonomics, assess and modify postural abnormalities, and instruct in home and community integration to attain remaining goals. []  See Plan of Care  []  See progress note/recertification  []  See Discharge Summary         Progress towards goals / Updated goals:  Updated Goals: to be achieved in 4 weeks:                1. The patient will improve FOTO score to 60 to improve ease of ADLs. Progressed to 56                2. The patient will improve ER strength 4/5 MMT to improve ease of ADLs.               PN: mid trap strength: 3+/5 MMT               4. The patient will report 75% improvement since onset to improve ease of ADLs.                IE: 0%              Current: NT     PLAN  [x]  Upgrade activities as tolerated     []  Continue plan of care  []  Update interventions per flow sheet       []  Discharge due to:_  []  Other:_      Dennis Marcano, PT 10/26/2022  2:13 PM    Future Appointments   Date Time Provider Talon Duggan   10/28/2022 12:00 PM Ismael [address] [facility]   10/31/2022 12:45 PM [facility]   10/31/2022  1:30 PM [facility] RM 1 [facility]

## 2022-10-28 ENCOUNTER — HOSPITAL ENCOUNTER (OUTPATIENT)
Dept: PHYSICAL THERAPY | Age: 62
Discharge: HOME | End: 2022-10-28
Attending: ORTHOPAEDIC SURGERY
Payer: COMMERCIAL

## 2022-10-28 ENCOUNTER — APPOINTMENT (OUTPATIENT)
Dept: PHYSICAL THERAPY | Age: 62
End: 2022-10-28
Attending: ORTHOPAEDIC SURGERY
Payer: COMMERCIAL

## 2022-10-28 PROCEDURE — 97032 APPL MODALITY 1+ESTIM EA 15: CPT

## 2022-10-28 PROCEDURE — 97535 SELF CARE MNGMENT TRAINING: CPT

## 2022-10-28 PROCEDURE — 97140 MANUAL THERAPY 1/> REGIONS: CPT

## 2022-10-28 NOTE — PROGRESS NOTES
PT DAILY TREATMENT NOTE     Patient Name: Lashae Kimball  Date:10/28/2022  : 1960  [x]  Patient  Verified  Payor: BLUE CROSS / Plan:  [facility] / Product Type: PPO /    In time:12:00  Out time:12:38  Total Treatment Time (min): 38  Visit #: 3 of 8    Medicare/BCBS Only   Total Timed Codes (min):  38 1:1 Treatment Time:  38       Treatment Area: Pain in left shoulder [M25.512]    SUBJECTIVE  Pain Level (0-10 scale): 3  Any medication changes, allergies to medications, adverse drug reactions, diagnosis change, or new procedure performed?: [x] No    [] Yes (see summary sheet for update)  Subjective functional status/changes:   [] No changes reported  The pt presents today still reporting a lot of UT soreness that she is having difficulty managing at home.  She requests more of a light session if ok    OBJECTIVE    Modality rationale: decrease pain and increase tissue extensibility to improve the patients ability to perform ADLs   Min Type Additional Details    [] Estim:  []Unatt       []IFC  []Premod                        []Other:  []w/ice   []w/heat  Position:  Location:   8+2 [x] Estim: [x]Att    []TENS instruct  []NMES                    []Other:  [x]w/US   []w/ice   []w/heat  Position: seated  Location: left UT/LS    []  Traction: [] Cervical       []Lumbar                       [] Prone          []Supine                       []Intermittent   []Continuous Lbs:  [] before manual  [] after manual    []  Ultrasound: []Continuous   [] Pulsed                           []1MHz   []3MHz W/cm2:  Location:    []  Iontophoresis with dexamethasone         Location: [] Take home patch   [] In clinic    []  Ice     []  heat  []  Ice massage  []  Laser   []  Anodyne Position:  Location:    []  Laser with stim  []  Other:  Position:  Location:    []  Vasopneumatic Device    []  Right     []  Left  Pre-treatment girth:  Post-treatment girth:  Measured at (location):  Pressure:       [] lo [] med [] hi   Temperature: [] lo [] med [] hi   [] Skin assessment post-treatment:  []intact []redness- no adverse reaction    []redness - adverse reaction:       10 min Manual Therapy:  left UT/LS STM & MFR in supine with passive UT stretch, left scap mobs with UT STM in right s/l   The manual therapy interventions were performed at a separate and distinct time from the therapeutic activities interventions. Rationale: decrease pain and increase tissue extensibility to improve ease of ADLs    18 min Self Care/Home Management: pt education regarding home management of pain and flexibility exercises to assist with pain management   Rationale: increase ROM and improve pain   to improve the patients ability to perform self care          With   [] TE   [] TA   [] neuro   [] other: Patient Education: [x] Review HEP    [] Progressed/Changed HEP based on:   [] positioning   [] body mechanics   [] transfers   [] heat/ice application    [] other:      Other Objective/Functional Measures:      Pain Level (0-10 scale) post treatment: 2    ASSESSMENT/Changes in Function: Held exercise interventions today due to pt reporting still having a fair amount of soreness. Appears to be reproduced with palpation to left UT trigger point through anterior fibers. Advised pt on self management for flexibility and pain control over the weekend to reassess next visit    Patient will continue to benefit from skilled PT services to modify and progress therapeutic interventions, address functional mobility deficits, address ROM deficits, address strength deficits, analyze and address soft tissue restrictions, analyze and cue movement patterns, and analyze and modify body mechanics/ergonomics to attain remaining goals.      []  See Plan of Care  []  See progress note/recertification  []  See Discharge Summary         Progress towards goals / Updated goals:  Updated Goals: to be achieved in 4 weeks:                1. The patient will improve FOTO score to 60 to improve ease of ADLs. Progressed to 56                2. The patient will improve ER strength 4/5 MMT to improve ease of ADLs. PN: mid trap strength: 3+/5 MMT               4. The patient will report 75% improvement since onset to improve ease of ADLs.                IE: 0%              Current: NT; slow progress, recent exacerbation in pain this week 10/28/2022    PLAN  []  Upgrade activities as tolerated     [x]  Continue plan of care  []  Update interventions per flow sheet       []  Discharge due to:_  []  Other:_      Megan Nazario DPT CMTPT 10/28/2022  12:09 PM    Future Appointments   Date Time Provider Talon Duggan   10/31/2022 12:45 PM [facility]   10/31/2022  1:30 PM [facility]   11/1/2022  1:30 PM Ruben Mabry, PTA [facility]   11/3/2022  7:45 AM Chanda Echavarria, PT [facility]   11/8/2022  6:00 PM Ángel Teixeira [facility]   11/10/2022  8:30 AM Chanda Echavarria, PT [facility]   11/15/2022 11:15 AM Isaac Linton, PT [facility]   11/17/2022 11:15 AM Junior Guillory, PT [facility]

## 2022-10-31 ENCOUNTER — HOSPITAL ENCOUNTER (OUTPATIENT)
Age: 62
Discharge: HOME | End: 2022-10-31
Attending: ORTHOPAEDIC SURGERY
Payer: COMMERCIAL

## 2022-10-31 DIAGNOSIS — M89.8X1 PAIN OF LEFT SCAPULA: ICD-10-CM

## 2022-10-31 DIAGNOSIS — R22.32 MASS OF LEFT UPPER EXTREMITY: ICD-10-CM

## 2022-10-31 PROCEDURE — 73223 MRI JOINT UPR EXTR W/O&W/DYE: CPT

## 2022-10-31 PROCEDURE — 74011250636 HC RX REV CODE- 250/636: Performed by: ORTHOPAEDIC SURGERY

## 2022-10-31 PROCEDURE — A9575 INJ GADOTERATE MEGLUMI 0.1ML: HCPCS | Performed by: ORTHOPAEDIC SURGERY

## 2022-10-31 PROCEDURE — 82565 ASSAY OF CREATININE: CPT

## 2022-10-31 PROCEDURE — 72141 MRI NECK SPINE W/O DYE: CPT

## 2022-10-31 RX ADMIN — GADOTERATE MEGLUMINE 15 ML: 376.9 INJECTION INTRAVENOUS at 13:27

## 2022-11-01 ENCOUNTER — HOSPITAL ENCOUNTER (OUTPATIENT)
Dept: PHYSICAL THERAPY | Age: 62
Discharge: HOME | End: 2022-11-01
Attending: ORTHOPAEDIC SURGERY
Payer: COMMERCIAL

## 2022-11-01 LAB — CREAT UR-MCNC: 0.6 MG/DL (ref 0.6–1.3)

## 2022-11-01 PROCEDURE — 97110 THERAPEUTIC EXERCISES: CPT

## 2022-11-01 PROCEDURE — 97032 APPL MODALITY 1+ESTIM EA 15: CPT

## 2022-11-01 PROCEDURE — 97140 MANUAL THERAPY 1/> REGIONS: CPT

## 2022-11-01 NOTE — PROGRESS NOTES
PT DAILY TREATMENT NOTE     Patient Name: Andreina Og  Date:2022  : 1960  [x]  Patient  Verified  Payor: BLUE CROSS / Plan: [address] [place] [address] [place] / Product Type: PPO /    In time:131  Out time:218  Total Treatment Time (min): 0  Visit #: 4 of 8    Medicare/BCBS Only   Total Timed Codes (min):  47 1:1 Treatment Time:  52       Treatment Area: Pain in left shoulder [M25.512]    SUBJECTIVE  Pain Level (0-10 scale): 1-1.5  Any medication changes, allergies to medications, adverse drug reactions, diagnosis change, or new procedure performed?: [x] No    [] Yes (see summary sheet for update)  Subjective functional status/changes:   [] No changes reported  Patient reports that she feels much better since the last visit and that the combo really helps. OBJECTIVE    Modality rationale: decrease inflammation, decrease pain, and increase tissue extensibility to improve the patients ability to tolerate ADLs.     Min Type Additional Details    [] Estim:  []Unatt       []IFC  []Premod                        []Other:  []w/ice   []w/heat  Position:  Location:   8 (6+2 setup) [x] Estim: [x]Att    []TENS instruct  []NMES                    []Other:  [x]w/US   []w/ice   []w/heat  Position: seated  Location: left UT/LS    []  Traction: [] Cervical       []Lumbar                       [] Prone          []Supine                       []Intermittent   []Continuous Lbs:  [] before manual  [] after manual    []  Ultrasound: []Continuous   [] Pulsed                           []1MHz   []3MHz W/cm2:  Location:    []  Iontophoresis with dexamethasone         Location: [] Take home patch   [] In clinic    []  Ice     []  heat  []  Ice massage  []  Laser   []  Anodyne Position:  Location:    []  Laser with stim  []  Other:  Position:  Location:    []  Vasopneumatic Device    []  Right     []  Left  Pre-treatment girth:  Post-treatment girth:  Measured at (location):  Pressure:       [] lo [] med [] hi Temperature: [] lo [] med [] hi   [x] Skin assessment post-treatment:  [x]intact [x]redness- no adverse reaction    []redness - adverse reaction:         31 min Therapeutic Exercise:  [x] See flow sheet :   Rationale: increase ROM and increase strength to improve the patients ability to complete ADLs with ease. 8 min Manual Therapy:  STM/TPR to B UT/LS, left PROM into right lateral flexion, active Mfr to left scalenes/left SCM, all with patient supine   The manual therapy interventions were performed at a separate and distinct time from the therapeutic activities interventions. Rationale: decrease pain, increase ROM, increase tissue extensibility, and decrease trigger points to improve functional mobility. With   [] TE   [] TA   [] neuro   [] other: Patient Education: [x] Review HEP    [] Progressed/Changed HEP based on:   [] positioning   [] body mechanics   [] transfers   [] heat/ice application    [] other:      Other Objective/Functional Measures:   Maintained reps and exercises from visit prior to last.      Pain Level (0-10 scale) post treatment: 0    ASSESSMENT/Changes in Function: Today's session focused on increased scapular strength and mobility with reps and loads maintained from visit prior to last. Patient tolerated treatment well, reporting no adverse responses throughout activities and reporting notable improvement in pain level post combo e-stim and manual.     Patient will continue to benefit from skilled PT services to modify and progress therapeutic interventions, address functional mobility deficits, address ROM deficits, address strength deficits, analyze and address soft tissue restrictions, analyze and cue movement patterns, analyze and modify body mechanics/ergonomics, assess and modify postural abnormalities, and instruct in home and community integration to attain remaining goals.      []  See Plan of Care  []  See progress note/recertification  []  See Discharge Summary         Progress towards goals / Updated goals:  Updated Goals: to be achieved in 4 weeks:                1. The patient will improve FOTO score to 60 to improve ease of ADLs. Progressed to 56                2. The patient will improve ER strength 4/5 MMT to improve ease of ADLs. PN: mid trap strength: 3+/5 MMT               4. The patient will report 75% improvement since onset to improve ease of ADLs.                IE: 0%              Current: NT; slow progress, recent exacerbation in pain this week 10/28/2022    PLAN  []  Upgrade activities as tolerated     []  Continue plan of care  []  Update interventions per flow sheet       []  Discharge due to:_  []  Other:_      Quita Fair, PTA 11/1/2022  8:19 AM    Future Appointments   Date Time Provider Talon Duggan   11/1/2022  1:30 PM Eloy Fry, PTA [facility]   11/3/2022  7:45 AM Roger Lucia, PT [facility]   11/8/2022  6:00 PM Edgardo Patrick [facility]   11/10/2022  8:30 AM Roger Lucia, PT [facility]   11/15/2022 11:15 AM Lance Katz, PT [facility]   11/17/2022 11:15 AM Lana Guillory, PT [facility]

## 2022-11-03 ENCOUNTER — HOSPITAL ENCOUNTER (OUTPATIENT)
Dept: PHYSICAL THERAPY | Age: 62
End: 2022-11-03
Attending: ORTHOPAEDIC SURGERY
Payer: COMMERCIAL

## 2022-11-03 ENCOUNTER — TELEPHONE (OUTPATIENT)
Dept: PHYSICAL THERAPY | Age: 62
End: 2022-11-03

## 2022-11-08 ENCOUNTER — APPOINTMENT (OUTPATIENT)
Dept: PHYSICAL THERAPY | Age: 62
End: 2022-11-08
Attending: ORTHOPAEDIC SURGERY
Payer: COMMERCIAL

## 2022-11-10 ENCOUNTER — APPOINTMENT (OUTPATIENT)
Dept: PHYSICAL THERAPY | Age: 62
End: 2022-11-10
Attending: ORTHOPAEDIC SURGERY
Payer: COMMERCIAL

## 2022-11-15 ENCOUNTER — APPOINTMENT (OUTPATIENT)
Dept: PHYSICAL THERAPY | Age: 62
End: 2022-11-15
Attending: ORTHOPAEDIC SURGERY
Payer: COMMERCIAL

## 2022-11-17 ENCOUNTER — APPOINTMENT (OUTPATIENT)
Dept: PHYSICAL THERAPY | Age: 62
End: 2022-11-17
Attending: ORTHOPAEDIC SURGERY
Payer: COMMERCIAL

## 2022-12-27 ENCOUNTER — OFFICE VISIT (OUTPATIENT)
Dept: ORTHOPEDIC SURGERY | Age: 62
End: 2022-12-27
Payer: COMMERCIAL

## 2022-12-27 VITALS — WEIGHT: 156.8 LBS | BODY MASS INDEX: 26.77 KG/M2 | HEIGHT: 64 IN

## 2022-12-27 DIAGNOSIS — S56.519A PARTIAL TEAR OF COMMON EXTENSOR TENDON OF ELBOW: ICD-10-CM

## 2022-12-27 DIAGNOSIS — M19.019 AC JOINT ARTHROPATHY: ICD-10-CM

## 2022-12-27 DIAGNOSIS — M48.02 FORAMINAL STENOSIS OF CERVICAL REGION: Primary | ICD-10-CM

## 2022-12-27 PROCEDURE — 99214 OFFICE O/P EST MOD 30 MIN: CPT | Performed by: ORTHOPAEDIC SURGERY

## 2022-12-27 NOTE — PROGRESS NOTES
Elvira Castellano  1960   No chief complaint on file. HISTORY OF PRESENT ILLNESS  Riri Sanchez is a 58 y.o. female who presents today for left elbow MRI review and cervical spine MRI review. Patient rates pain as 2/10 today. Pain has been present for 1 year. No specific injury but she was helping take care of her mother who had cancer which required a lot of lifting and pulling. Had MRI performed last year. She feels like the pain is spreading upwards to her neck. Also notes a lump on the inside of her left elbow, denies any changes in size but is tender to touch. She also states she has a similar lump near her left wrist. Notes her left elbow symptoms do not significantly limit her. Also reports left-sided scapular pain and pulling sensation in her neck. Endorses night pain from her shoulder. Has tried applying Lidocaine patches. At [facility] on 9/07/2022, patient had a left AC joint cortisone injection which provided some relief. Has also  been prescribed Baclofen and Mobic. Has been referred to PT previously however it has been hard to attend PT because she lives 4 hours away. Patient denies any fever, chills, chest pain, shortness of breath or calf pain. The remainder of the review of systems is negative. There are no new illness or injuries to report since last seen in the office. There are no changes to medications, allergies, family or social history. PHYSICAL EXAM:   Visit Vitals  Ht 5' 4\" (1.626 m)   Wt 156 lb 12.8 oz (71.1 kg)   BMI 26.91 kg/m²     The patient is a well-developed, well-nourished female   in no acute distress. The patient is alert and oriented times three. The patient is alert and oriented times three. Mood and affect are normal.  LYMPHATIC: lymph nodes are not enlarged and are within normal limits  SKIN: normal in color and non tender to palpation. There are no bruises or abrasions noted. NEUROLOGICAL: Motor sensory exam is within normal limits.  Reflexes are equal bilaterally. There is normal sensation to pinprick and light touch  MUSCULOSKELETAL:  Examination Left shoulder   Skin Intact   AC joint tenderness +   Biceps tenderness -   Forward flexion/Elevation    Active abduction    Glenohumeral abduction 90   External rotation ROM 60   Internal rotation ROM 45   Apprehension -   Linettes Relocation -   Jerk -   Load and Shift -   Obriens -   Speeds -   Impingement sign +   Supraspinatus/Empty Can -, 5/5   External Rotation Strength -, 5/5   Lift Off/Belly Press -, 5/5   Neurovascular Intact         Examination Neck   Skin Intact   Tenderness, Paracervical +   Paracervical spasms  +   Flexion Decreased 25%   Extension Decreased 25%   Lateral bend left Normal   Lateral bend right Normal   Masses -   Spurling sign -   Biceps reflex Normal   Triceps reflex Normal   Brachioradialis reflex Normal   Sensation Normal   Soft tissue mass over left elbow 3cm in diameter        IMAGING: MRI of left elbow without and with IV contrast dated 10/31/2022 was reviewed and read by Dr. Federica Patel:   IMPRESSION:  No definite soft tissue mass within the region of interest at the anteromedial  aspect of the elbow. Asymmetric subcutaneous fat is present in this region which may reflect asymmetric fat deposition or nonencapsulated lipoma. Partial-thickness interstitial tear at the common extensor tendon origin  involving approximately 50-75% tendon thickness. MRI of cervical spine dated 10/31/2022 was reviewed and read by Dr. Federica Patel:   IMPRESSION:  1. Straightening of the cervical lordosis with mild multilevel  spondylolisthesis. 2.  Multilevel degenerative changes, most pronounced at the C5-6 and C6-7 level. Foraminal stenosis is most pronounced and severe at right and moderate to severe  at left C5-6 and moderate to severe bilateral C6-7 levels. 3.  No high-grade spinal canal stenosis.   4.  Multilevel facet arthritis, most significant and moderate to severe at the  C7-T1 level where there is associated mild likely degenerative anterolisthesis. XR of the left shoulder with 3 views obtained in the office dated 9/07/2022 was reviewed and read by Dr. Rayshawn Moore: Mild degenerative changes in the [facility] joint        MRI of left shoulder from [facility] dated 7/13/2021 was reviewed and read by Dr. Rayshawn Moore:   IMPRESSION:  Low-grade interstitial tearing of the supraspinatus and infraspinatus tendons  Moderate acromioclavicular joint osteoarthritis    IMPRESSION:      ICD-10-CM ICD-9-CM    1. Foraminal stenosis of cervical region  M48.02 723.0 REFERRAL TO SPINE SURGERY      2. AC joint arthropathy  M19.019 719.91       3. Partial tear of common extensor tendon of elbow  S56.519A 841.8              PLAN:   1. . Pt presents today with left scapular pain and left shoulder pain c/w AC joint arthropathy. Cervical spine MRI reveals evidence of cervical foraminal stenosis. Will refer to the [address] [place] [address] for further evaluation. She also presents with mass of left elbow and left elbow MRI reveals evidence of lipoma and partial common extensor tendon tear. She notes her elbow symptoms do not significantly limit her at this time and we will continue to monitor. Risk factors include: htn  2. No ultrasound exam indicated today  3. No cortisone injection indicated today   4. No Physical/Occupational Therapy indicated today  5. No diagnostic test indicated today:  6. No durable medical equipment indicated today  7. Yes referral indicated today José  8. No medications indicated today:   9. No Narcotic indicated today       RTC prn      Scribed by Jeana Matutetingjania Vasquez) as dictated by Tony Nolan MD    I, Dr. Tony Nolan, confirm that all documentation is accurate.     Tony Nolan M.D.   [facility]

## 2023-01-04 NOTE — PROGRESS NOTES
[facility]  [address]  [place], [address]  [phone] fax    Physical Therapy Discharge Summary  Patient name: Rico Burns Start of Care: 2022   Referral source: Maude Adilene,* : 1960                Medical Diagnosis: Post-traumatic osteoarthritis, other specified site [M19.19]  Other specified disorders of bone, shoulder [M89.8X1]  Payor: BLUE CROSS / Plan: [address] [facility] [place] / Product Type: PPO /  Onset Date: May 2021                Treatment Diagnosis: Left shoulder pain   Prior Hospitalization: see medical history Provider#: [id number]   Medications: Verified on Patient summary List    Comorbidities: Hysterectomy, Allergies, Arthritis, back pain   Prior Level of Function: The patient reports she had improved ease of reaching overhead and sleeping prior to onset. Visits from Start of Care: 8    Missed Visits: 2  Reporting Period : 10/19/2022 to 2022      Summary of Care:  Updated Goals: to be achieved in 4 weeks:                1. The patient will improve FOTO score to 60 to improve ease of ADLs. Progressed to 56                2. The patient will improve ER strength 4/5 MMT to improve ease of ADLs. PN: mid trap strength: 3+/5 MMT               4. The patient will report 75% improvement since onset to improve ease of ADLs. IE: 0%              Current: NT; slow progress, recent exacerbation in pain this week 10/28/2022         ASSESSMENT/RECOMMENDATIONS: The patient cancelled all remaining appointments. She had made some progress with PT, though further imaging was to be performed. The patient failed to return within 30 days. Unable to further assess goals.      [x]Discontinue therapy: []Patient has reached or is progressing toward set goals      [x]Patient is non-compliant or has abdicated      []Due to lack of appreciable progress towards set goals    Steven Meyer Braden Rizo, PT 1/4/2023 1:25 PM

## 2023-02-02 ENCOUNTER — OFFICE VISIT (OUTPATIENT)
Dept: ORTHOPEDIC SURGERY | Age: 63
End: 2023-02-02
Payer: COMMERCIAL

## 2023-02-02 VITALS
RESPIRATION RATE: 16 BRPM | TEMPERATURE: 97.1 F | OXYGEN SATURATION: 96 % | WEIGHT: 153 LBS | DIASTOLIC BLOOD PRESSURE: 89 MMHG | BODY MASS INDEX: 26.12 KG/M2 | SYSTOLIC BLOOD PRESSURE: 127 MMHG | HEART RATE: 81 BPM | HEIGHT: 64 IN

## 2023-02-02 DIAGNOSIS — M77.12 LATERAL EPICONDYLITIS OF LEFT ELBOW: ICD-10-CM

## 2023-02-02 DIAGNOSIS — M54.2 NECK PAIN: ICD-10-CM

## 2023-02-02 DIAGNOSIS — M79.602 PAIN OF LEFT UPPER EXTREMITY: ICD-10-CM

## 2023-02-02 DIAGNOSIS — M47.812 ARTHROPATHY OF CERVICAL FACET JOINT: ICD-10-CM

## 2023-02-02 DIAGNOSIS — M54.12 CERVICAL RADICULOPATHY: Primary | ICD-10-CM

## 2023-02-02 DIAGNOSIS — M79.18 RHOMBOID MUSCLE PAIN: ICD-10-CM

## 2023-02-02 DIAGNOSIS — M79.18 MYOFASCIAL PAIN: ICD-10-CM

## 2023-02-02 RX ORDER — BUPROPION HYDROCHLORIDE 150 MG/1
TABLET ORAL
COMMUNITY
Start: 2023-01-27

## 2023-02-02 RX ORDER — GABAPENTIN 100 MG/1
100 CAPSULE ORAL 3 TIMES DAILY
Qty: 45 CAPSULE | Refills: 0 | Status: SHIPPED
Start: 2023-02-02 | End: 2023-02-08 | Stop reason: CLARIF

## 2023-02-02 NOTE — PROGRESS NOTES
[facility] [address]  [address], [address]  [place], [facility] [address]  Phone: [phone]  Fax: [phone]           Americo Booth  : 1960  PCP: Luis Mason MD  2023    NEW PATIENT    HISTORY OF PRESENT ILLNESS  Zach Robb is a 58 y.o. female new patient seen on 2023 c/o neck, shoulder and left arm pain. Pt reports that she tore her shoulder when her mother took a tumble, and she tried to help in 2021. Pt reports that lying down, exercising makes it worse. She reports that she went to Dr. Shailesh Drummond for the pain where she received an injection in her shoulder, and was Rx'd Meloxicam and referred to Physical therapy. She reports relief with the treatment by Dr. Shailesh Drummond. Pt reports that she gets woken up at night due to the constant pain she was in. Pain Score: 3/10     Treatments patient has tried:  Physical therapy: Yes  Doing HEP: ***  Non-opioid medications tried: ***  Spinal injections: ***  Spinal surgery- ***  Last *** Spine MRI: ***     PmHx: Hypertension, anxiety, borderline DM, left ventricular hypertrophy  PsHx: Tonsillectomy, cholecystectomy, gastric bypass, hysterectomy, breast augmentation, abdominoplasty   Social Hx: social drinking, exercise (walk, elliptical)  Family Hx: Mother- Arthritis      ASSESSMENT  Riri Robb is a 58 y.o. female with ***      PLAN  I Rx'd Gabapentin 300 mg, 1 PO TID. I anticipate a LUE EMG at the next OV. Pt will f/u in *** or sooner if needed. {There are no diagnoses linked to this encounter.  (Refresh or delete this SmartLink)}         CHIEF COMPLAINT  Riri Robb is seen today in consultation at the request of Luis Mason MD for complaints of ***         PAST MEDICAL HISTORY   Past Medical History:   Diagnosis Date    Bladder mass     Calculus of kidney     Cancer ([facility] [address]     Congestive heart failure, unspecified     COVID-19 vaccine series completed     3 DOSES    Hypertension     Liver disease     Fatty liver    Nausea & vomiting     Psychiatric disorder     ANXIETY    Sphincter of Oddi dysfunction     Uterine prolapse        Past Surgical History:   Procedure Laterality Date    HX ABDOMINOPLASTY      HX CHOLECYSTECTOMY      HX COLONOSCOPY      HX DILATION AND CURETTAGE      Multiple    HX GASTRIC BYPASS      HX GYN      3 VAGINAL BIRTH    HX HEENT  10/2021    ORAL SURGERY- WITH BROKEN METAL IN JAW    HX HEMORRHOIDECTOMY      HX SEPTOPLASTY      HX TONSIL AND ADENOIDECTOMY      HX UROLOGICAL      CYSTO BLADDER BX    KY BREAST SURGERY PROCEDURE UNLISTED      augmentation       MEDICATIONS      Current Outpatient Medications   Medication Sig Dispense Refill    baclofen (LIORESAL) 10 mg tablet Take 1 Tablet by mouth two (2) times a day. 60 Tablet 1    meloxicam (Mobic) 15 mg tablet Take 1 Tablet by mouth daily (with breakfast). 30 Tablet 1    amLODIPine (NORVASC) 2.5 mg tablet Take  by mouth daily. raloxifene (EVISTA) 60 mg tablet Take  by mouth daily. PT INSTRUCTED TO VERIFY WITH SURGEON IF OR WHEN TO STOP  Indications: post-menopausal osteoporosis prevention      irbesartan (AVAPRO) 75 mg tablet Take 37.5 mg by mouth two (2) times a day. zolpidem (AMBIEN) 10 mg tablet Take  by mouth nightly as needed for Sleep. 5-10MG      sertraline (ZOLOFT) 100 mg tablet Take 100 mg by mouth every evening. carvediloL (COREG) 6.25 mg tablet Take  by mouth two (2) times daily (with meals). ergocalciferol (ERGOCALCIFEROL) 50,000 unit capsule every seven (7) days. CONTOUR NEXT STRIPS strip       cyanocobalamin (VITAMIN B12) 2,500 mcg sublingual tablet Take 2,500 mcg by mouth daily. calcium carbonate (CALTREX) 600 mg (1,500 mg) tablet Take 600 mg by mouth two (2) times a day. multivitamins chew Take  by mouth.  Flinstones         ALLERGIES    Allergies   Allergen Reactions    Tape [Adhesive] Other (comments)     Silk tape blisters          SOCIAL HISTORY    Social History     Socioeconomic History    Marital status:    Tobacco Use    Smoking status: Never    Smokeless tobacco: Never   Substance and Sexual Activity    Alcohol use: Yes    Drug use: No       FAMILY HISTORY    Family History   Problem Relation Age of Onset    Heart Disease Mother     Cancer Mother     Hypertension Mother     Diabetes Mother     Heart Disease Father     Stroke Father     Cancer Brother     Hypertension Brother        REVIEW OF SYSTEMS  Review of Systems   Musculoskeletal:  Positive for myalgias. Psychiatric/Behavioral:  The patient has insomnia. ***    PHYSICAL EXAMINATION  There were no vitals taken for this visit. Pain Assessment  9/7/2022   Location of Pain Shoulder;Neck   Location Modifiers Left   Severity of Pain 2   Quality of Pain Dull;Aching   Duration of Pain Persistent   Frequency of Pain Constant   Limiting Behavior Yes       Constitutional:  Well developed, well nourished, in no acute distress. Psychiatric: Affect and mood are appropriate. HEENT: Normocephalic, atraumatic. Extraocular movements intact. Integumentary: No rashes or abrasions noted on exposed areas. Cardiovascular: Regular rate and rhythm. Pulmonary: Clear to auscultation bilaterally. SPINE/MUSCULOSKELETAL EXAM    Cervical spine:  Neck is midline. Normal muscle tone. No focal atrophy is noted. ROM pain free. Shoulder ROM intact. *** tenderness to palpation of ***  Negative Spurling's sign. Negative Tinel's sign. Negative Danielson's sign. Sensation in the bilateral arms grossly intact to light touch. Lumbar spine:  No rash, ecchymosis, or gross obliquity. No fasciculations. No focal atrophy is noted. No pain with hip ROM. Full range of motion. *** tenderness to palpation of ***  No tenderness to palpation at the sciatic notch. SI joints non-tender. Trochanters non tender.      Sensation in the bilateral legs grossly intact to light touch.    MOTOR:      Elbow Flex  Elbow Ext Arm Abd Wrist Ext Wrist Flex Hand Intrin   Right 5/5 5/5 5/5 5/5 5/5 5/5   Left 5/5 5/5 5/5 5/5 5/5 5/5             Hip flex  Knee Ext EHL Ankle DF Ankle PF      Right 5/5 5/5 5/5 5/5 5/5    Left 5/5 5/5 5/5 5/5 5/5      DTRs are {Numbers; 0-4 (with +):[id number]} biceps, triceps, brachioradialis, patella, and Achilles. Negative Straight Leg raise. Squat not tested. No difficulty with tandem gait. Ambulation without assistive device. FWB. RADIOGRAPHS  *** images taken on *** personally reviewed with patient:  ***    45 minutes of face-to-face contact were spent with the patient during today's visit extensively discussing symptoms and treatment plan. All questions were answered. More than half of this visit today was spent on counseling. Written by Akash Aragon, as dictated by Dr. Cammie Sierra.

## 2023-02-02 NOTE — PROGRESS NOTES
Chief Complaint   Patient presents with    New Patient       Pt preferred language for health care discussion is english. Is someone accompanying this pt? no    Is the patient using any DME equipment during OV? no    Depression Screening:  No flowsheet data found. Learning Assessment:  Learning Assessment 10/31/2014   PRIMARY LEARNER Patient   PRIMARY LANGUAGE ENGLISH   LEARNER PREFERENCE PRIMARY LISTENING   ANSWERED BY patient   RELATIONSHIP SELF         Health Maintenance reviewed and discussed per provider. Yes        Advance Directive:  1. Do you have an advance directive in place? Patient Reply:no    2. If not, would you like material regarding how to put one in place? Patient Reply: no    Coordination of Care:  1. Have you been to the ER, urgent care clinic since your last visit? Hospitalized since your last visit? no    2. Have you seen or consulted any other health care providers outside of the [address] [place] [address] since your last visit? Include any pap smears or colon screening.  no

## 2023-02-07 NOTE — PROGRESS NOTES
[address], [address]  [place], [address] [place] [address] Street  Phone: [phone]  Fax: [phone]           Griselda Pion  : 1960  PCP: River Calixto MD  2023    NEW PATIENT    HISTORY OF PRESENT ILLNESS  Tan Evans is a 58 y.o. female c/o neck, shoulder and left arm pain. Pt reports that she tore her shoulder when her mother took a tumble, and she tried to help in 2021. Pt reports that lying down, exercising makes it worse. She reports that she went to Dr. Nirmal Gardner for the pain where she received an injection in her shoulder, and was Rx'd Meloxicam and referred to PT. She reports relief with the treatment by Dr. Nirmal Gardner. Pt reports that she gets woken up at night due to the constant pain she was in. Cervical MRI images dated 10/31/22 were reviewed. Per report, straightening of the cervical lordosis with mild multilevel  spondylolisthesis. Multilevel degenerative changes, most pronounced at the C5-6 and C6-7 level. Foraminal stenosis is most pronounced and severe at right and moderate to severe  at left C5-6 and moderate to severe bilateral C6-7 levels. No high-grade spinal canal stenosis. Multilevel facet arthritis, most significant and moderate to severe at the C7-T1 level where there is associated mild likely degenerative anterolisthesis. Pain Score: 3/10     PmHx: Hypertension, anxiety, borderline DM, left ventricular hypertrophy  Social Hx: social drinking, exercise (walk, elliptical)      ASSESSMENT  Riri Evans is a 58 y.o. female with neck and left shoulder pain that radiates down LUE. Her symptoms may be due to a potential left cervical radiculopathy and left rotator cuff pathology based on presentation and imaging. There is radiologic evidence of moderate-to-severe foraminal stenosis at left C5-6, C6-7 and C7-T1.  She has tenderness throughout left periscapular musculature, especially at trapezius and rhomboids. Otherwise, patient is neurologically intact with normal strength and sensation. We discussed cervical spine injection treatment, and pt would like to explore other treatment options at this time. We will further evaluate through LUE EMG. PLAN  LUE EMG  Gabapentin 300 mg TID ramp  Maintain HEP, and integrate provided exercises into HEP to relieve periscapular discomfort    Pt will f/u for EMG or sooner if needed. Diagnoses and all orders for this visit:    1. Arthritis  -     gabapentin (NEURONTIN) 100 mg capsule; Take 1 Capsule by mouth three (3) times daily. Max Daily Amount: 300 mg.    2. Myofascial pain  -     gabapentin (NEURONTIN) 100 mg capsule; Take 1 Capsule by mouth three (3) times daily. Max Daily Amount: 300 mg.    3. Lateral epicondylitis of left elbow  -     gabapentin (NEURONTIN) 100 mg capsule; Take 1 Capsule by mouth three (3) times daily. Max Daily Amount: 300 mg.    4. Rhomboid muscle pain  -     gabapentin (NEURONTIN) 100 mg capsule; Take 1 Capsule by mouth three (3) times daily. Max Daily Amount: 300 mg. CHIEF COMPLAINT  Belinda Hortencia Soulier is seen today in consultation at the request of Miguel Frausto MD for complaints of neck and LUE pain.          PAST MEDICAL HISTORY   Past Medical History:   Diagnosis Date    Bladder mass     Calculus of kidney     Cancer ([facility] [address]     Congestive heart failure, unspecified     COVID-19 vaccine series completed     3 DOSES    Hypertension     Liver disease     Fatty liver    Nausea & vomiting     Psychiatric disorder     ANXIETY    Sphincter of Oddi dysfunction     Uterine prolapse        Past Surgical History:   Procedure Laterality Date    HX ABDOMINOPLASTY      HX CHOLECYSTECTOMY      HX COLONOSCOPY      HX DILATION AND CURETTAGE      Multiple    HX GASTRIC BYPASS      HX GYN      3 VAGINAL BIRTH    HX HEENT  10/2021    ORAL SURGERY- WITH BROKEN METAL IN JAW    HX HEMORRHOIDECTOMY      HX SEPTOPLASTY      HX TONSIL AND ADENOIDECTOMY      HX UROLOGICAL      CYSTO BLADDER BX    VT UNLISTED PROCEDURE BREAST      augmentation       MEDICATIONS      Current Outpatient Medications   Medication Sig Dispense Refill    buPROPion XL (WELLBUTRIN XL) 150 mg tablet       gabapentin (NEURONTIN) 100 mg capsule Take 1 Capsule by mouth three (3) times daily. Max Daily Amount: 300 mg. 45 Capsule 0    meloxicam (Mobic) 15 mg tablet Take 1 Tablet by mouth daily (with breakfast). 30 Tablet 1    amLODIPine (NORVASC) 2.5 mg tablet Take  by mouth daily. raloxifene (EVISTA) 60 mg tablet Take  by mouth daily. PT INSTRUCTED TO VERIFY WITH SURGEON IF OR WHEN TO STOP  Indications: post-menopausal osteoporosis prevention      zolpidem (AMBIEN) 10 mg tablet Take  by mouth nightly as needed for Sleep. 5-10MG      sertraline (ZOLOFT) 100 mg tablet Take 100 mg by mouth every evening. carvediloL (COREG) 6.25 mg tablet Take  by mouth two (2) times daily (with meals). ergocalciferol (ERGOCALCIFEROL) 50,000 unit capsule every seven (7) days. CONTOUR NEXT STRIPS strip       cyanocobalamin (VITAMIN B12) 2,500 mcg sublingual tablet Take 2,500 mcg by mouth daily. calcium carbonate (CALTREX) 600 mg (1,500 mg) tablet Take 600 mg by mouth two (2) times a day. multivitamins chew Take  by mouth. Flinstones      baclofen (LIORESAL) 10 mg tablet Take 1 Tablet by mouth two (2) times a day. (Patient not taking: Reported on 2/2/2023) 60 Tablet 1    irbesartan (AVAPRO) 75 mg tablet Take 37.5 mg by mouth two (2) times a day.          ALLERGIES    Allergies   Allergen Reactions    Tape [Adhesive] Other (comments)     Silk tape blisters          SOCIAL HISTORY    Social History     Socioeconomic History    Marital status:    Tobacco Use    Smoking status: Never    Smokeless tobacco: Never   Substance and Sexual Activity    Alcohol use: Yes    Drug use: No       FAMILY HISTORY    Family History   Problem Relation Age of Onset    Heart Disease Mother Cancer Mother     Hypertension Mother     Diabetes Mother     Heart Disease Father     Stroke Father     Cancer Brother     Hypertension Brother        REVIEW OF SYSTEMS  Review of Systems   Constitutional:  Negative for chills, fever and weight loss. Respiratory:  Negative for shortness of breath. Cardiovascular:  Negative for chest pain. Gastrointestinal:  Negative for constipation. Negative for fecal incontinence   Genitourinary:  Negative for dysuria. Negative for urinary incontinence   Musculoskeletal:  Positive for myalgias and neck pain. Skin:  Negative for rash. Neurological:  Negative for dizziness, tingling, tremors, focal weakness and headaches. Endo/Heme/Allergies:  Does not bruise/bleed easily. Psychiatric/Behavioral:  The patient does not have insomnia. PHYSICAL EXAMINATION  Visit Vitals  /89 (BP 1 Location: Left upper arm, BP Patient Position: Sitting, BP Cuff Size: Adult)   Pulse 81   Temp 97.1 °F (36.2 °C) (Temporal)   Resp 16   Ht 5' 4\" (1.626 m)   Wt 153 lb (69.4 kg)   SpO2 96%   BMI 26.26 kg/m²       Pain Assessment  2/2/2023   Location of Pain Shoulder   Location Modifiers Left   Severity of Pain 3   Quality of Pain Aching;Burning   Duration of Pain Persistent   Frequency of Pain Constant   Date Pain First Started (No Data)   Date Pain First Started Comment 20022   Aggravating Factors (No Data)   Aggravating Factors Comment sleeping, using arm   Limiting Behavior Yes   Relieving Factors Heat;Ice   Relieving Factors Comment patches       Constitutional:  Well developed, well nourished, in no acute distress. Psychiatric: Affect and mood are appropriate. HEENT: Normocephalic, atraumatic. Extraocular movements intact. Integumentary: No rashes or abrasions noted on exposed areas. Cardiovascular: Regular rate and rhythm. Pulmonary: Clear to auscultation bilaterally. SPINE/MUSCULOSKELETAL EXAM    Cervical spine:  Neck is midline.    Normal muscle tone.   No focal atrophy is noted. ROM pain free. Shoulder ROM intact. Tenderness to palpation of periscapular musculature, especially at left rhomboids and left trapezius. Negative Spurling's sign. Negative Tinel's sign. Negative Danielson's sign. Sensation in the bilateral arms grossly intact to light touch. Lumbar spine:  No rash, ecchymosis, or gross obliquity. No fasciculations. No focal atrophy is noted. No pain with hip ROM. Full range of motion. No tenderness to palpation. No tenderness to palpation at the sciatic notch. SI joints non-tender. Trochanters non tender. Sensation in the bilateral legs grossly intact to light touch. MOTOR:      Elbow Flex  Elbow Ext Arm Abd Wrist Ext Wrist Flex Hand Intrin   Right 5/5 5/5 5/5 5/5 5/5 5/5   Left 5/5 5/5 5/5 5/5 5/5 5/5             Hip flex  Knee Ext EHL Ankle DF Ankle PF      Right 5/5 5/5 5/5 5/5 5/5    Left 5/5 5/5 5/5 5/5 5/5      Negative Straight Leg raise. Squat not tested. No difficulty with tandem gait. Ambulation without assistive device. FWB. RADIOGRAPHS  Cervical MRI WO Contrast images taken on 10/31/22 personally reviewed with patient:  FINDINGS:      ALIGNMENT: Straightening of the cervical lordosis. Trace anterolisthesis of C3  on C4, C4 on C5, and C7-T1. Trace retrolisthesis of C5 on C6. VERTEBRAL BODY HEIGHTS: Mild height loss of the C5 and C6 vertebral bodies,  likely due to chronic degenerative disease or remote prior trauma. No evidence  of acute fracture. MARROW SIGNAL: Multiple Schmorl's nodes are present. Minimal sclerosis at the  C5-6 and C6-7 endplates, likely degenerative. Trace fluid within the bilateral  C1/C2 lateral mass articulations, right greater than left. Otherwise  unremarkable. ATLANTOAXIAL AND ATLANTOOCCIPITAL ARTICULATIONS: Congruent. CEREBELLAR TONSILS: No Chiari 1 malformation. CORD: No cord signal abnormality identified.       VISUALIZED SOFT TISSUES/OTHER: Unremarkable     Disc levels:     C2-3: No significant disc herniation. Mild right greater left bilateral  uncovertebral hypertrophy. Moderate facet hypertrophy. Spinal canal: Mild stenosis     Right foramen: Minimal stenosis     Left foramen: No significant stenosis. C3-4: Minimal broad-based protrusion. Mild left greater than right bilateral  facet joint hypertrophy. Spinal canal: No significant stenosis. Right foramen: No significant stenosis. Left foramen: Mild to moderate stenosis     C4-5: No significant disc herniation. Mild right greater than left uncovertebral  hypertrophy. Mild to moderate right facet joint hypertrophy. Spinal canal: No significant stenosis. Right foramen: Mild stenosis     Left foramen: Mild stenosis     C5-6: Disc desiccation with asymmetric moderate size disc bulge. Moderate to  marked right and mild to moderate left uncovertebral hypertrophy. Mild bilateral  facet hypertrophy. Posterior ligamentous buckling. Spinal canal: No significant stenosis. Right foramen: Severe stenosis     Left foramen: Moderate to severe stenosis     C6-7: Disc desiccation. Moderate-sized broad-based protrusion. Right greater  than left bilateral uncovertebral hypertrophy. Spinal canal: Mild stenosis. Right foramen: Moderate to severe stenosis     Left foramen: Moderate to severe stenosis     C7-T1: Uncovering of the intervertebral disc. Minimal bilateral uncovertebral  hypertrophy. Moderate bilateral facet hypertrophy. Spinal canal: No significant stenosis. Right foramen: Mild stenosis     Left foramen: Mild stenosis     T1-2: No significant disc herniation. Mild bilateral facet hypertrophy. Mild to  moderate bilateral foraminal stenosis. No significant spinal canal stenosis. T2 T3-T4 5: Evaluated only in the sagittal plane. Mild multilevel degenerative  disc disease. No significant spinal canal stenosis.  Mild bilateral T2-3  foraminal stenosis. No significant T3-4 or T4-5 foraminal stenosis. IMPRESSION  1. Straightening of the cervical lordosis with mild multilevel  spondylolisthesis. 2.  Multilevel degenerative changes, most pronounced at the C5-6 and C6-7 level. Foraminal stenosis is most pronounced and severe at right and moderate to severe  at left C5-6 and moderate to severe bilateral C6-7 levels. 3.  No high-grade spinal canal stenosis. 4.  Multilevel facet arthritis, most significant and moderate to severe at the  C7-T1 level where there is associated mild likely degenerative anterolisthesis. 45 minutes of face-to-face contact were spent with the patient during today's visit extensively discussing symptoms and treatment plan. All questions were answered. More than half of this visit today was spent on counseling. Written by Aleja Hernandez, as dictated by Dr. Julieth Rosario.

## 2023-02-08 DIAGNOSIS — M79.602 PAIN OF LEFT UPPER EXTREMITY: ICD-10-CM

## 2023-02-08 DIAGNOSIS — M79.18 MYOFASCIAL PAIN: ICD-10-CM

## 2023-02-08 DIAGNOSIS — M47.812 ARTHROPATHY OF CERVICAL FACET JOINT: ICD-10-CM

## 2023-02-08 DIAGNOSIS — M54.2 NECK PAIN: ICD-10-CM

## 2023-02-08 DIAGNOSIS — M77.12 LATERAL EPICONDYLITIS OF LEFT ELBOW: ICD-10-CM

## 2023-02-08 DIAGNOSIS — M79.18 RHOMBOID MUSCLE PAIN: ICD-10-CM

## 2023-02-08 DIAGNOSIS — M54.12 CERVICAL RADICULOPATHY: ICD-10-CM

## 2023-02-08 RX ORDER — GABAPENTIN 300 MG/1
300 CAPSULE ORAL 3 TIMES DAILY
Qty: 90 CAPSULE | Refills: 1 | Status: SHIPPED | OUTPATIENT
Start: 2023-02-08 | End: 2023-03-10

## (undated) DEVICE — GLIDESHEATH SLENDER STAINLESS STEEL KIT: Brand: GLIDESHEATH SLENDER

## (undated) DEVICE — RADIFOCUS OPTITORQUE ANGIOGRAPHIC CATHETER: Brand: OPTITORQUE

## (undated) DEVICE — DRAPE,ANGIO,BRACH,STERILE,38X44: Brand: MEDLINE

## (undated) DEVICE — ANGIOGRAPHY KIT CUST VASC

## (undated) DEVICE — PACK PROCEDURE SURG VASC CATH 161 MMC LF

## (undated) DEVICE — BAND RADIAL COMPR ARTERY 24CM -- REG BX/10

## (undated) DEVICE — SET FLD ADMIN 3 W STPCOCK FIX FEM L BOR 1IN

## (undated) DEVICE — PRESSURE MONITORING SET: Brand: TRUWAVE

## (undated) DEVICE — PROCEDURE KIT FLUID MGMT 10 FR CUST MAINFOLD

## (undated) DEVICE — COVER US PRB W15XL120CM W/ GEL RUBBERBAND TAPE STRP FLD GEN